# Patient Record
Sex: MALE | Race: ASIAN | NOT HISPANIC OR LATINO | URBAN - METROPOLITAN AREA
[De-identification: names, ages, dates, MRNs, and addresses within clinical notes are randomized per-mention and may not be internally consistent; named-entity substitution may affect disease eponyms.]

---

## 2018-02-08 ENCOUNTER — COMMUNICATION - HEALTHEAST (OUTPATIENT)
Dept: TELEHEALTH | Facility: CLINIC | Age: 43
End: 2018-02-08

## 2018-02-08 ENCOUNTER — OFFICE VISIT - HEALTHEAST (OUTPATIENT)
Dept: FAMILY MEDICINE | Facility: CLINIC | Age: 43
End: 2018-02-08

## 2018-02-08 DIAGNOSIS — I10 ACCELERATED HYPERTENSION: ICD-10-CM

## 2018-02-08 DIAGNOSIS — Z00.00 ROUTINE GENERAL MEDICAL EXAMINATION AT A HEALTH CARE FACILITY: ICD-10-CM

## 2018-02-08 DIAGNOSIS — L40.9 PSORIASIS: ICD-10-CM

## 2018-02-08 DIAGNOSIS — E66.9 OBESITY: ICD-10-CM

## 2018-02-08 LAB
ALBUMIN SERPL-MCNC: 3.8 G/DL (ref 3.5–5)
ALP SERPL-CCNC: 87 U/L (ref 45–120)
ALT SERPL W P-5'-P-CCNC: 33 U/L (ref 0–45)
ANION GAP SERPL CALCULATED.3IONS-SCNC: 14 MMOL/L (ref 5–18)
AST SERPL W P-5'-P-CCNC: 28 U/L (ref 0–40)
ATRIAL RATE - MUSE: 85 BPM
BILIRUB SERPL-MCNC: 0.6 MG/DL (ref 0–1)
BUN SERPL-MCNC: 17 MG/DL (ref 8–22)
CALCIUM SERPL-MCNC: 9.3 MG/DL (ref 8.5–10.5)
CHLORIDE BLD-SCNC: 107 MMOL/L (ref 98–107)
CHOLEST SERPL-MCNC: 220 MG/DL
CO2 SERPL-SCNC: 19 MMOL/L (ref 22–31)
CREAT SERPL-MCNC: 1.33 MG/DL (ref 0.7–1.3)
DIASTOLIC BLOOD PRESSURE - MUSE: NORMAL MMHG
ERYTHROCYTE [DISTWIDTH] IN BLOOD BY AUTOMATED COUNT: 13.3 % (ref 11–14.5)
FASTING STATUS PATIENT QL REPORTED: NO
GFR SERPL CREATININE-BSD FRML MDRD: 60 ML/MIN/1.73M2
GLUCOSE BLD-MCNC: 94 MG/DL (ref 70–125)
HBA1C MFR BLD: 6.1 % (ref 3.5–6)
HCT VFR BLD AUTO: 46.7 % (ref 40–54)
HDLC SERPL-MCNC: 44 MG/DL
HGB BLD-MCNC: 15.5 G/DL (ref 14–18)
INTERPRETATION ECG - MUSE: NORMAL
MAGNESIUM SERPL-MCNC: 2.4 MG/DL (ref 1.8–2.6)
MCH RBC QN AUTO: 28.7 PG (ref 27–34)
MCHC RBC AUTO-ENTMCNC: 33.2 G/DL (ref 32–36)
MCV RBC AUTO: 86 FL (ref 80–100)
P AXIS - MUSE: 35 DEGREES
PLATELET # BLD AUTO: 235 THOU/UL (ref 140–440)
PMV BLD AUTO: 8.6 FL (ref 7–10)
POTASSIUM BLD-SCNC: 4.3 MMOL/L (ref 3.5–5)
PR INTERVAL - MUSE: 168 MS
PROT SERPL-MCNC: 8 G/DL (ref 6–8)
QRS DURATION - MUSE: 100 MS
QT - MUSE: 398 MS
QTC - MUSE: 473 MS
R AXIS - MUSE: 35 DEGREES
RBC # BLD AUTO: 5.4 MILL/UL (ref 4.4–6.2)
SODIUM SERPL-SCNC: 140 MMOL/L (ref 136–145)
SYSTOLIC BLOOD PRESSURE - MUSE: NORMAL MMHG
T AXIS - MUSE: 10 DEGREES
TSH SERPL DL<=0.005 MIU/L-ACNC: 3.26 UIU/ML (ref 0.3–5)
VENTRICULAR RATE- MUSE: 85 BPM
WBC: 10.1 THOU/UL (ref 4–11)

## 2018-02-08 ASSESSMENT — MIFFLIN-ST. JEOR: SCORE: 1753.45

## 2018-02-10 ENCOUNTER — COMMUNICATION - HEALTHEAST (OUTPATIENT)
Dept: FAMILY MEDICINE | Facility: CLINIC | Age: 43
End: 2018-02-10

## 2018-02-15 ENCOUNTER — OFFICE VISIT - HEALTHEAST (OUTPATIENT)
Dept: FAMILY MEDICINE | Facility: CLINIC | Age: 43
End: 2018-02-15

## 2018-02-15 DIAGNOSIS — N28.9 RENAL INSUFFICIENCY: ICD-10-CM

## 2018-02-15 DIAGNOSIS — I10 ESSENTIAL HYPERTENSION: ICD-10-CM

## 2018-02-15 DIAGNOSIS — R73.01 IMPAIRED FASTING GLUCOSE: ICD-10-CM

## 2018-03-15 ENCOUNTER — COMMUNICATION - HEALTHEAST (OUTPATIENT)
Dept: TELEHEALTH | Facility: CLINIC | Age: 43
End: 2018-03-15

## 2018-03-15 ENCOUNTER — OFFICE VISIT - HEALTHEAST (OUTPATIENT)
Dept: FAMILY MEDICINE | Facility: CLINIC | Age: 43
End: 2018-03-15

## 2018-03-15 DIAGNOSIS — R73.01 IMPAIRED FASTING GLUCOSE: ICD-10-CM

## 2018-03-15 DIAGNOSIS — L30.9 DERMATITIS: ICD-10-CM

## 2018-03-15 DIAGNOSIS — E78.00 HYPERCHOLESTEREMIA: ICD-10-CM

## 2018-03-15 DIAGNOSIS — I10 ESSENTIAL HYPERTENSION: ICD-10-CM

## 2018-03-15 DIAGNOSIS — N28.9 RENAL INSUFFICIENCY: ICD-10-CM

## 2018-03-15 LAB
ANION GAP SERPL CALCULATED.3IONS-SCNC: 8 MMOL/L (ref 5–18)
BUN SERPL-MCNC: 18 MG/DL (ref 8–22)
CALCIUM SERPL-MCNC: 9.5 MG/DL (ref 8.5–10.5)
CHLORIDE BLD-SCNC: 103 MMOL/L (ref 98–107)
CHOLEST SERPL-MCNC: 222 MG/DL
CO2 SERPL-SCNC: 27 MMOL/L (ref 22–31)
CREAT SERPL-MCNC: 1.42 MG/DL (ref 0.7–1.3)
FASTING STATUS PATIENT QL REPORTED: YES
GFR SERPL CREATININE-BSD FRML MDRD: 55 ML/MIN/1.73M2
GLUCOSE BLD-MCNC: 93 MG/DL (ref 70–125)
HDLC SERPL-MCNC: 42 MG/DL
LDLC SERPL CALC-MCNC: 150 MG/DL
POTASSIUM BLD-SCNC: 4 MMOL/L (ref 3.5–5)
SODIUM SERPL-SCNC: 138 MMOL/L (ref 136–145)
TRIGL SERPL-MCNC: 150 MG/DL

## 2018-04-04 ENCOUNTER — COMMUNICATION - HEALTHEAST (OUTPATIENT)
Dept: FAMILY MEDICINE | Facility: CLINIC | Age: 43
End: 2018-04-04

## 2018-04-04 DIAGNOSIS — I10 ACCELERATED HYPERTENSION: ICD-10-CM

## 2018-07-24 ENCOUNTER — COMMUNICATION - HEALTHEAST (OUTPATIENT)
Dept: FAMILY MEDICINE | Facility: CLINIC | Age: 43
End: 2018-07-24

## 2018-07-24 DIAGNOSIS — I10 ACCELERATED HYPERTENSION: ICD-10-CM

## 2019-04-09 ENCOUNTER — OFFICE VISIT - HEALTHEAST (OUTPATIENT)
Dept: FAMILY MEDICINE | Facility: CLINIC | Age: 44
End: 2019-04-09

## 2019-04-09 DIAGNOSIS — I10 ACCELERATED HYPERTENSION: ICD-10-CM

## 2019-04-09 DIAGNOSIS — N18.30 CKD (CHRONIC KIDNEY DISEASE) STAGE 3, GFR 30-59 ML/MIN (H): ICD-10-CM

## 2019-04-09 DIAGNOSIS — R06.83 SNORING: ICD-10-CM

## 2019-04-09 DIAGNOSIS — R73.01 IMPAIRED FASTING GLUCOSE: ICD-10-CM

## 2019-04-09 DIAGNOSIS — E66.09 CLASS 2 OBESITY DUE TO EXCESS CALORIES WITHOUT SERIOUS COMORBIDITY WITH BODY MASS INDEX (BMI) OF 36.0 TO 36.9 IN ADULT: ICD-10-CM

## 2019-04-09 DIAGNOSIS — E66.812 CLASS 2 OBESITY DUE TO EXCESS CALORIES WITHOUT SERIOUS COMORBIDITY WITH BODY MASS INDEX (BMI) OF 36.0 TO 36.9 IN ADULT: ICD-10-CM

## 2019-04-30 ENCOUNTER — OFFICE VISIT - HEALTHEAST (OUTPATIENT)
Dept: FAMILY MEDICINE | Facility: CLINIC | Age: 44
End: 2019-04-30

## 2019-04-30 DIAGNOSIS — I10 ACCELERATED HYPERTENSION: ICD-10-CM

## 2019-04-30 DIAGNOSIS — E66.01 CLASS 2 SEVERE OBESITY DUE TO EXCESS CALORIES WITH SERIOUS COMORBIDITY AND BODY MASS INDEX (BMI) OF 35.0 TO 35.9 IN ADULT (H): ICD-10-CM

## 2019-04-30 DIAGNOSIS — N18.30 CKD (CHRONIC KIDNEY DISEASE) STAGE 3, GFR 30-59 ML/MIN (H): ICD-10-CM

## 2019-04-30 DIAGNOSIS — E66.812 CLASS 2 SEVERE OBESITY DUE TO EXCESS CALORIES WITH SERIOUS COMORBIDITY AND BODY MASS INDEX (BMI) OF 35.0 TO 35.9 IN ADULT (H): ICD-10-CM

## 2019-05-02 ENCOUNTER — COMMUNICATION - HEALTHEAST (OUTPATIENT)
Dept: FAMILY MEDICINE | Facility: CLINIC | Age: 44
End: 2019-05-02

## 2019-05-02 DIAGNOSIS — I10 ACCELERATED HYPERTENSION: ICD-10-CM

## 2019-05-03 ENCOUNTER — COMMUNICATION - HEALTHEAST (OUTPATIENT)
Dept: FAMILY MEDICINE | Facility: CLINIC | Age: 44
End: 2019-05-03

## 2019-06-26 ENCOUNTER — OFFICE VISIT - HEALTHEAST (OUTPATIENT)
Dept: SLEEP MEDICINE | Facility: CLINIC | Age: 44
End: 2019-06-26

## 2019-06-26 DIAGNOSIS — R06.83 SNORING: ICD-10-CM

## 2019-06-26 DIAGNOSIS — G47.10 EXCESSIVE SLEEPINESS: ICD-10-CM

## 2019-06-26 DIAGNOSIS — I10 HTN (HYPERTENSION), BENIGN: ICD-10-CM

## 2019-06-26 DIAGNOSIS — R06.81 WITNESSED APNEIC SPELLS: ICD-10-CM

## 2019-06-26 DIAGNOSIS — E66.811 CLASS 1 OBESITY DUE TO EXCESS CALORIES WITH SERIOUS COMORBIDITY AND BODY MASS INDEX (BMI) OF 34.0 TO 34.9 IN ADULT: ICD-10-CM

## 2019-06-26 DIAGNOSIS — E66.09 CLASS 1 OBESITY DUE TO EXCESS CALORIES WITH SERIOUS COMORBIDITY AND BODY MASS INDEX (BMI) OF 34.0 TO 34.9 IN ADULT: ICD-10-CM

## 2019-06-26 ASSESSMENT — MIFFLIN-ST. JEOR: SCORE: 1739.84

## 2019-07-22 ENCOUNTER — RECORDS - HEALTHEAST (OUTPATIENT)
Dept: SLEEP MEDICINE | Facility: CLINIC | Age: 44
End: 2019-07-22

## 2019-07-22 ENCOUNTER — RECORDS - HEALTHEAST (OUTPATIENT)
Dept: ADMINISTRATIVE | Facility: OTHER | Age: 44
End: 2019-07-22

## 2019-07-22 DIAGNOSIS — E66.09 OTHER OBESITY DUE TO EXCESS CALORIES: ICD-10-CM

## 2019-07-22 DIAGNOSIS — R06.83 SNORING: ICD-10-CM

## 2019-07-22 DIAGNOSIS — R06.81 APNEA, NOT ELSEWHERE CLASSIFIED: ICD-10-CM

## 2019-07-22 DIAGNOSIS — I10 ESSENTIAL (PRIMARY) HYPERTENSION: ICD-10-CM

## 2019-07-22 DIAGNOSIS — G47.10 HYPERSOMNIA, UNSPECIFIED: ICD-10-CM

## 2019-07-29 ENCOUNTER — COMMUNICATION - HEALTHEAST (OUTPATIENT)
Dept: SLEEP MEDICINE | Facility: CLINIC | Age: 44
End: 2019-07-29

## 2019-07-29 DIAGNOSIS — G47.33 OSA (OBSTRUCTIVE SLEEP APNEA): ICD-10-CM

## 2019-07-31 ENCOUNTER — OFFICE VISIT - HEALTHEAST (OUTPATIENT)
Dept: FAMILY MEDICINE | Facility: CLINIC | Age: 44
End: 2019-07-31

## 2019-07-31 DIAGNOSIS — I10 ESSENTIAL HYPERTENSION: ICD-10-CM

## 2019-07-31 DIAGNOSIS — L40.9 PSORIASIS: ICD-10-CM

## 2019-07-31 DIAGNOSIS — G47.33 OSA ON CPAP: ICD-10-CM

## 2019-07-31 DIAGNOSIS — N18.30 CKD (CHRONIC KIDNEY DISEASE) STAGE 3, GFR 30-59 ML/MIN (H): ICD-10-CM

## 2019-07-31 DIAGNOSIS — E66.09 CLASS 2 OBESITY DUE TO EXCESS CALORIES WITHOUT SERIOUS COMORBIDITY WITH BODY MASS INDEX (BMI) OF 35.0 TO 35.9 IN ADULT: ICD-10-CM

## 2019-07-31 DIAGNOSIS — E66.812 CLASS 2 OBESITY DUE TO EXCESS CALORIES WITHOUT SERIOUS COMORBIDITY WITH BODY MASS INDEX (BMI) OF 35.0 TO 35.9 IN ADULT: ICD-10-CM

## 2019-07-31 LAB
ANION GAP SERPL CALCULATED.3IONS-SCNC: 6 MMOL/L (ref 5–18)
BUN SERPL-MCNC: 19 MG/DL (ref 8–22)
CALCIUM SERPL-MCNC: 9.9 MG/DL (ref 8.5–10.5)
CHLORIDE BLD-SCNC: 104 MMOL/L (ref 98–107)
CO2 SERPL-SCNC: 30 MMOL/L (ref 22–31)
CREAT SERPL-MCNC: 1.56 MG/DL (ref 0.7–1.3)
GFR SERPL CREATININE-BSD FRML MDRD: 49 ML/MIN/1.73M2
GLUCOSE BLD-MCNC: 85 MG/DL (ref 70–125)
POTASSIUM BLD-SCNC: 4.3 MMOL/L (ref 3.5–5)
SODIUM SERPL-SCNC: 140 MMOL/L (ref 136–145)

## 2019-07-31 ASSESSMENT — MIFFLIN-ST. JEOR: SCORE: 1746.19

## 2019-08-19 ENCOUNTER — AMBULATORY - HEALTHEAST (OUTPATIENT)
Dept: SLEEP MEDICINE | Facility: CLINIC | Age: 44
End: 2019-08-19

## 2019-10-14 ENCOUNTER — OFFICE VISIT - HEALTHEAST (OUTPATIENT)
Dept: SLEEP MEDICINE | Facility: CLINIC | Age: 44
End: 2019-10-14

## 2019-10-14 DIAGNOSIS — G47.33 OSA (OBSTRUCTIVE SLEEP APNEA): ICD-10-CM

## 2019-10-14 ASSESSMENT — MIFFLIN-ST. JEOR: SCORE: 1762.52

## 2019-12-13 ENCOUNTER — AMBULATORY - HEALTHEAST (OUTPATIENT)
Dept: NURSING | Facility: CLINIC | Age: 44
End: 2019-12-13

## 2019-12-13 DIAGNOSIS — Z00.00 HEALTHCARE MAINTENANCE: ICD-10-CM

## 2020-01-31 ENCOUNTER — OFFICE VISIT - HEALTHEAST (OUTPATIENT)
Dept: FAMILY MEDICINE | Facility: CLINIC | Age: 45
End: 2020-01-31

## 2020-01-31 DIAGNOSIS — L40.9 PSORIASIS: ICD-10-CM

## 2020-01-31 DIAGNOSIS — E66.812 CLASS 2 SEVERE OBESITY DUE TO EXCESS CALORIES WITH SERIOUS COMORBIDITY AND BODY MASS INDEX (BMI) OF 35.0 TO 35.9 IN ADULT (H): ICD-10-CM

## 2020-01-31 DIAGNOSIS — M79.671 RIGHT FOOT PAIN: ICD-10-CM

## 2020-01-31 DIAGNOSIS — E66.01 CLASS 2 SEVERE OBESITY DUE TO EXCESS CALORIES WITH SERIOUS COMORBIDITY AND BODY MASS INDEX (BMI) OF 35.0 TO 35.9 IN ADULT (H): ICD-10-CM

## 2020-01-31 DIAGNOSIS — E78.00 HYPERCHOLESTEREMIA: ICD-10-CM

## 2020-01-31 DIAGNOSIS — R73.01 IMPAIRED FASTING GLUCOSE: ICD-10-CM

## 2020-01-31 DIAGNOSIS — N18.30 CKD (CHRONIC KIDNEY DISEASE) STAGE 3, GFR 30-59 ML/MIN (H): ICD-10-CM

## 2020-01-31 DIAGNOSIS — Z00.00 ROUTINE GENERAL MEDICAL EXAMINATION AT A HEALTH CARE FACILITY: ICD-10-CM

## 2020-01-31 DIAGNOSIS — G47.33 OSA ON CPAP: ICD-10-CM

## 2020-01-31 DIAGNOSIS — I10 ESSENTIAL HYPERTENSION: ICD-10-CM

## 2020-01-31 LAB
ALBUMIN SERPL-MCNC: 4.2 G/DL (ref 3.5–5)
ALP SERPL-CCNC: 82 U/L (ref 45–120)
ALT SERPL W P-5'-P-CCNC: 39 U/L (ref 0–45)
ANION GAP SERPL CALCULATED.3IONS-SCNC: 11 MMOL/L (ref 5–18)
AST SERPL W P-5'-P-CCNC: 18 U/L (ref 0–40)
BILIRUB SERPL-MCNC: 0.4 MG/DL (ref 0–1)
BUN SERPL-MCNC: 16 MG/DL (ref 8–22)
CALCIUM SERPL-MCNC: 9.4 MG/DL (ref 8.5–10.5)
CHLORIDE BLD-SCNC: 106 MMOL/L (ref 98–107)
CHOLEST SERPL-MCNC: 221 MG/DL
CO2 SERPL-SCNC: 23 MMOL/L (ref 22–31)
CREAT SERPL-MCNC: 1.34 MG/DL (ref 0.7–1.3)
FASTING STATUS PATIENT QL REPORTED: YES
GFR SERPL CREATININE-BSD FRML MDRD: 58 ML/MIN/1.73M2
GLUCOSE BLD-MCNC: 94 MG/DL (ref 70–125)
HBA1C MFR BLD: 5.7 % (ref 3.5–6)
HDLC SERPL-MCNC: 43 MG/DL
HIV 1+2 AB+HIV1 P24 AG SERPL QL IA: NEGATIVE
LDLC SERPL CALC-MCNC: 139 MG/DL
POTASSIUM BLD-SCNC: 3.9 MMOL/L (ref 3.5–5)
PROT SERPL-MCNC: 7.8 G/DL (ref 6–8)
SODIUM SERPL-SCNC: 140 MMOL/L (ref 136–145)
TRIGL SERPL-MCNC: 197 MG/DL

## 2020-01-31 RX ORDER — METOPROLOL SUCCINATE 50 MG/1
50 TABLET, EXTENDED RELEASE ORAL DAILY
Qty: 90 TABLET | Refills: 1 | Status: SHIPPED | OUTPATIENT
Start: 2020-01-31 | End: 2022-07-06

## 2020-01-31 ASSESSMENT — MIFFLIN-ST. JEOR: SCORE: 1757.42

## 2020-02-10 ENCOUNTER — OFFICE VISIT - HEALTHEAST (OUTPATIENT)
Dept: PODIATRY | Facility: CLINIC | Age: 45
End: 2020-02-10

## 2020-02-10 DIAGNOSIS — G57.61 MORTON'S NEUROMA OF RIGHT FOOT: ICD-10-CM

## 2020-02-10 ASSESSMENT — MIFFLIN-ST. JEOR: SCORE: 1757.42

## 2020-02-28 ENCOUNTER — COMMUNICATION - HEALTHEAST (OUTPATIENT)
Dept: FAMILY MEDICINE | Facility: CLINIC | Age: 45
End: 2020-02-28

## 2020-02-28 DIAGNOSIS — I10 ESSENTIAL HYPERTENSION: ICD-10-CM

## 2020-02-28 RX ORDER — AMLODIPINE AND BENAZEPRIL HYDROCHLORIDE 10; 40 MG/1; MG/1
1 CAPSULE ORAL DAILY
Qty: 90 CAPSULE | Refills: 3 | Status: SHIPPED | OUTPATIENT
Start: 2020-02-28 | End: 2022-07-06

## 2020-11-08 ENCOUNTER — AMBULATORY - HEALTHEAST (OUTPATIENT)
Dept: NURSING | Facility: CLINIC | Age: 45
End: 2020-11-08

## 2021-01-09 ENCOUNTER — COMMUNICATION - HEALTHEAST (OUTPATIENT)
Dept: FAMILY MEDICINE | Facility: CLINIC | Age: 46
End: 2021-01-09

## 2021-01-09 DIAGNOSIS — L40.9 PSORIASIS: ICD-10-CM

## 2021-01-11 RX ORDER — BETAMETHASONE DIPROPIONATE 0.5 MG/G
CREAM TOPICAL
Qty: 45 G | Refills: 1 | Status: SHIPPED | OUTPATIENT
Start: 2021-01-11 | End: 2022-06-29

## 2021-04-20 ENCOUNTER — AMBULATORY - HEALTHEAST (OUTPATIENT)
Dept: NURSING | Facility: CLINIC | Age: 46
End: 2021-04-20

## 2021-05-11 ENCOUNTER — AMBULATORY - HEALTHEAST (OUTPATIENT)
Dept: NURSING | Facility: CLINIC | Age: 46
End: 2021-05-11

## 2021-05-27 NOTE — PROGRESS NOTES
Assessment/Plan:    1. Accelerated hypertension  Accelerated hypertension.  Previously seen February 2018 and started on losartan hydrochlorothiazide 100/25 daily.  Follow-up in March 15, 2018 with blood pressure 124/88 on recheck.  However due to some described potential syncopal type episodes stopped medication several months ago.  Will initiate amlodipine benazepril 5/20 and use 1 capsule daily with consideration for dose increase to 2 capsules (10/40) after 2 weeks if blood pressures remain greater than 160/100.  Referred patient as well to sleep medicine for rule out HARRY with snoring history.  - amLODIPine-benazepril (LOTREL 5-20) 5-20 mg per capsule; Take 1 capsule by mouth daily.  Dispense: 30 capsule; Refill: 1  - Ambulatory referral to Sleep Medicine    2. CKD (chronic kidney disease) stage 3, GFR 30-59 ml/min (H)  CKD stage III with prior creatinine 1.42 and GFR 55.  Nonfasting and will check repeat labs at follow-up in 3 weeks.    3. Impaired fasting glucose  Impaired fasting glucose history.  Check A1c at follow-up in 3 weeks.    4. Class 2 obesity due to excess calories without serious comorbidity with body mass index (BMI) of 36.0 to 36.9 in adult  Dietary and exercise modification for weight goal less than 200 pounds initially, less than 180 pounds ideally.    5. Snoring  Sleep study to be completed with snoring history and accelerated hypertension rule out HARRY.  - Ambulatory referral to Sleep Medicine      The following high BMI interventions were performed this visit: encouragement to exercise, weight monitoring, weight loss from baseline weight and lifestyle education regarding diet         Subjective:    Rufino Turner is seen today for hypertension follow-up.  Had been seen in February 2018 with accelerated hypertension concerns.  Started on losartan hydrochlorothiazide 100/25 daily which seem to be helping however had a syncopal episode perhaps on an airplane however was not witnessed and also has  "had issues if he has 1 or 2 drinks typically beer where he might \"faint\".  Does snore.  No history of sleep study.  Obesity noted not getting consistent exercise.  CKD stage III with creatinine 1.42 and GFR 55 with prior A1c of 6.1%.  Mild cholesterol elevation.  Nonfasting today.  Denies chest pain.  No headache or vision change.  No other dizziness.  Comprehensive review of systems as above otherwise all negative.     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 No pertinent surgical history.     No family history on file.     History reviewed. No pertinent past medical history.     Social History     Tobacco Use     Smoking status: Former Smoker     Last attempt to quit: 2/15/2018     Years since quittin.1     Smokeless tobacco: Never Used   Substance Use Topics     Alcohol use: Not on file     Drug use: Not on file        Current Outpatient Medications   Medication Sig Dispense Refill     betamethasone dipropionate (DIPROLENE) 0.05 % cream apply topically sparingly to affected areas twice daily as needed 45 g 2     amLODIPine-benazepril (LOTREL 5-20) 5-20 mg per capsule Take 1 capsule by mouth daily. 30 capsule 1     losartan-hydrochlorothiazide (HYZAAR) 100-25 mg per tablet TAKE 1 TABLET BY MOUTH DAILY. 90 tablet 2     No current facility-administered medications for this visit.           Objective:    Vitals:    19 1035 19 1103   BP: (!) 172/120 (!) 168/128   Pulse: 100    SpO2: 97%    Weight: 212 lb (96.2 kg)       Body mass index is 36.11 kg/m .    Alert.  No apparent distress.  Blood pressure 160/128 on recheck.  Chest clear.  Cardiac exam regular.  Extremities warm " and dry.  BMI 36.11.      This note has been dictated using voice recognition software and as a result may contain minor grammatical errors and unintended word substitutions.

## 2021-05-28 NOTE — TELEPHONE ENCOUNTER
Amlodipine-benazepril 10-40 mg capsules pended for approval to take 1 capsule by mouth daily if appropriate

## 2021-05-28 NOTE — PROGRESS NOTES
Assessment/Plan:    1. Accelerated hypertension  Accelerated hypertension with improved control.  Blood pressure 114/86 on recheck.  Will increase amlodipine benazepril 5/20 from 1 capsule to 2 capsules daily.  Discontinue losartan hydrochlorothiazide due to potential interaction with benazepril and utilize hydrochlorothiazide 25 mg 1 tablet daily.  Blood pressure recheck at follow-up in 3 months.  - amLODIPine-benazepril (LOTREL 5-20) 5-20 mg per capsule; Take 2 capsules by mouth daily.  Dispense: 180 capsule; Refill: 1  - hydroCHLOROthiazide (HYDRODIURIL) 25 MG tablet; Take 1 tablet (25 mg total) by mouth daily.  Dispense: 90 tablet; Refill: 1    2. CKD (chronic kidney disease) stage 3, GFR 30-59 ml/min (H)  History of CKD stage III with prior creatinine 1.42 and GFR 55.  Repeat renal function at follow-up.    3. Class 2 severe obesity due to excess calories with serious comorbidity and body mass index (BMI) of 35.0 to 35.9 in adult (H)  Dietary and exercise modification for weight goal less than 200 pounds initially, less than 180 pounds ideally.  Does have scheduled office visit with sleep medicine June 26, 2019 with underlying obesity with history of accelerated hypertension.        Subjective:    Rufino GAIL Worthingtonjesenia is seen today for follow-up evaluation.  Accelerated hypertension.  Reviewed office note from April 9, 2019.  Had been on losartan hydrochlorothiazide 100/25.  Due to elevated blood pressure readings addition of amlodipine benazepril was provided.  No side effects.  History of impaired fasting glucose.  History of CKD stage III with prior creatinine 1.42 and GFR 55.  Does snore.  Due to elevated blood pressures etc. did set up for sleep study referral which is scheduled for June 26, 2019.  No chest pain.  Some increased urinary frequency.  Comprehensive review of systems as above otherwise all negative.     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 No pertinent surgical history.     No family history on file.     History reviewed. No pertinent past medical history.     Social History     Tobacco Use     Smoking status: Former Smoker     Last attempt to quit: 2/15/2018     Years since quittin.2     Smokeless tobacco: Never Used   Substance Use Topics     Alcohol use: Not on file     Drug use: Not on file        Current Outpatient Medications   Medication Sig Dispense Refill     amLODIPine-benazepril (LOTREL 5-20) 5-20 mg per capsule Take 2 capsules by mouth daily. 180 capsule 1     betamethasone dipropionate (DIPROLENE) 0.05 % cream apply topically sparingly to affected areas twice daily as needed 45 g 2     hydroCHLOROthiazide (HYDRODIURIL) 25 MG tablet Take 1 tablet (25 mg total) by mouth daily. 90 tablet 1     No current facility-administered medications for this visit.           Objective:    Vitals:    19 1027   BP: 120/90   Pulse: 91   SpO2: 95%   Weight: 209 lb (94.8 kg)      Body mass index is 35.6 kg/m .    Alert.  No apparent distress with blood pressure 114/86 on recheck.  Chest clear.  Cardiac exam regular.  Extremities warm and dry without significant peripheral edema.  No rash.      This note has been dictated using voice recognition software and as a result may contain minor grammatical errors and unintended word substitutions.

## 2021-05-28 NOTE — TELEPHONE ENCOUNTER
Medication Question or Clarification  Who is calling: Pharmacy: [facility] fax  What medication are you calling about? (include dose and sig)   Disp Refills Start End     amLODIPine-benazepril (LOTREL 5-20) 5-20 mg per capsule 180 capsule 1 4/30/2019     Sig - Route: Take 2 capsules by mouth daily. - Oral    Sent to pharmacy as: amLODIPine-benazepril (LOTREL 5-20) 5-20 mg per capsule    E-Prescribing Status: Receipt confirmed by pharmacy (4/30/2019 11:20 AM CDT)      Who prescribed the medication?: Solomon Wilson MD   What is your question/concern?: Fax stated insurance will only cover 1 cap per day. Pharmacy suggested for Solomon Wilson MD to send in the higher strength or do a PA.    ID: [id number]  Group: [id number]  PCN: [id number]  BIN: [id number]    Pharmacy: [facility]  Okay to leave a detailed message?: No  Site CMT - Please call the pharmacy to obtain any additional needed information.

## 2021-05-30 NOTE — PROGRESS NOTES
Dear  Solomon Wilson Md  [address]  [place] [address]    Thank you for the opportunity to participate in the care of  Rufino Turner.    Rufino Turner is sent by Solomon Wilson MD for a sleep consultation regarding snoring and elevated blood pressure.   He has a history of HTN and obesity.    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  Works 8:30 PM - 7 AM Tu - F.  Doesn't get into bed until around 10 AM and will sleep until around 1 PM.  Then naps another 2 hours from 5 - 7 PM.     On non-work nights will have early morning nap the first day (10 AM - 1 PM).  Then will nap around 6 - 7 PM.  Then will go to bed around 11 PM but wakes up every 1.5 - 2 hours to get up and pace around or snack or drink.     On Becker and M nights will try to sleep more normally but still up and down all night.  Usually in bed from 11 PM - 6 AM.      Sleep Disordered Breathing - Snoring is fairly loud and may wake wife from sleep. Has gasping and witnessed apneas in sleep.   Has nocturia few times per night.     Upon waking feels ok.  He denies morning headaches.  No morning dry mouth.  Does have morning sinus congestion.   Patient was counseled on the importance of driving while alert, to pull over if drowsy, or nap before getting into the vehicle if sleepy.    Movement/Behaviors - Nonsense somniloquy.  No somnambulism.  No sleep related eating.  No dream enactment behavior.  Does have jerks in sleep.   He is noted to have occasional bruxism.      Patient denies typical restless legs syndrome symptoms.    Alcohol use - 1 - 2 beers per month typically  Caffeine intake - 1 energy drink or coffee or soda at work. Last caffeine intake is usually overnight on night shift.  Tobacco exposure - 2 - 3 cigarettes.  Illicit substances - none    Lives with wife (Valentina) and 7 kids (18, 15, 11, 10, 9 , 7 , 4).  Has 1 pet, dog.     No immediate family history of snoring or  Obstructive Sleep Apnea     Past Medical History:  History reviewed. No pertinent past medical history.    Problem List:  There are no active problems to display for this patient.       Past Surgical History:  History reviewed. No pertinent surgical history.     Meds:  Current Outpatient Medications   Medication Sig Dispense Refill     amLODIPine-benazepril (LOTREL) 10-40 mg per capsule Take 1 capsule by mouth daily. 90 capsule 3     betamethasone dipropionate (DIPROLENE) 0.05 % cream apply topically sparingly to affected areas twice daily as needed 45 g 2     No current facility-administered medications for this visit.         Allergies:  Contrast [iodixanol] and Shrimp     Social History:  Social History     Socioeconomic History     Marital status:      Spouse name: Not on file     Number of children: Not on file     Years of education: Not on file     Highest education level: Not on file   Occupational History     Not on file   Social Needs     Financial resource strain: Not on file     Food insecurity:     Worry: Not on file     Inability: Not on file     Transportation needs:     Medical: Not on file     Non-medical: Not on file   Tobacco Use     Smoking status: Former Smoker     Last attempt to quit: 2/15/2018     Years since quittin.3     Smokeless tobacco: Never Used   Substance and Sexual Activity     Alcohol use: Not on file     Drug use: Not on file     Sexual activity: Not on file   Lifestyle     Physical activity:     Days per week: Not on file     Minutes per session: Not on file     Stress: Not on file   Relationships     Social connections:     Talks on phone: Not on file     Gets together: Not on file     Attends Sabianist service: Not on file     Active member of club or organization: Not on file     Attends meetings of clubs or organizations: Not on file     Relationship status: Not on file     Intimate partner violence:     Fear of current or ex partner: Not on file     Emotionally  "abused: Not on file     Physically abused: Not on file     Forced sexual activity: Not on file   Other Topics Concern     Not on file   Social History Narrative     Not on file        Family History:  History reviewed. No pertinent family history.     Review of Systems: Chest pain  A complete review of systems reviewed by me is negative with the exeption of what has been mentioned in the history of present illness.    Physical Exam:  BP (!) 142/106 (Patient Site: Right Arm, Patient Position: Sitting, Cuff Size: Adult Large)   Pulse 66   Ht 5' 4.25\" (1.632 m)   Wt 205 lb (93 kg)   SpO2 98%   BMI 34.91 kg/m    General appearance: No apparent distress, well groomed.    HEENT:   Head: Normocephalic, atraumatic.  Eyes: PERRL  Nose: Nares patent.  No exudate.  Septal deviation noted.  Mouth: Teeth: normal   Tongue: Normal  Oropharynx:  Mallampati Classification: IV    Tonsils: Grade 0    Uvula: Normal    Neck: Supple without bruit.      Cardiac: Regular rate and rhythm.  No murmurs.  Radial pulses are strong and symmetric.  Pulmonary: Symmetric air movement, lungs clear to auscultation bilaterally.  Musculoskeletal: No edema noted.  No clubbing or cyanosis.  Skin: Warm, dry, intact.  Neurologic: Alert and oriented to person, place and time   Gait is normal.  Psychiatric: Affect pleasant.  Mood good.     Labs/Studies:  Lab Results   Component Value Date    WBC 10.1 02/08/2018    HGB 15.5 02/08/2018    HCT 46.7 02/08/2018    MCV 86 02/08/2018     02/08/2018         Chemistry        Component Value Date/Time     03/15/2018 1643    K 4.0 03/15/2018 1643     03/15/2018 1643    CO2 27 03/15/2018 1643    BUN 18 03/15/2018 1643    CREATININE 1.42 (H) 03/15/2018 1643    GLU 93 03/15/2018 1643        Component Value Date/Time    CALCIUM 9.5 03/15/2018 1643    ALKPHOS 87 02/08/2018 1552    AST 28 02/08/2018 1552    ALT 33 02/08/2018 1552    BILITOT 0.6 02/08/2018 1552        No results found for: " FERRITIN  Lab Results   Component Value Date    TSH 3.26 02/08/2018     Lab Results   Component Value Date    HGBA1C 6.1 (H) 02/08/2018     Assessment and Plan:  1. Snoring  2. Witnessed apneic spells  I have a high suspicion for HARRY based on presence of snoring, witnessed apneas, obesity, elevated neck circumference, gender and ESS. We discussed pathophysiology of obstructive sleep apnea, testing with overnight polysomnography, and treatment modalities (CPAP only discussed at this visit). We discussed consequences of untreated Obstructive Sleep Apnea, such as markedly elevated risk for heart attack or stroke. Patient is interested in treatment and willing to undergo overnight sleep testing.  Home sleep testing is appropriate for this patient.  Study has been ordered today.    - Home Sleep Test with Type III Device; Future    3. Excessive sleepiness  Combination of shift work, insufficient sleep, and possible HARRY. Counseled on strategies for improving sleep but due to family constraints he is somewhat stuck.  - Home Sleep Test with Type III Device; Future    4. HTN (hypertension), benign  Patient was counseled on the effects of untreated/sub-optimally treated hypertension.  He was told to discuss findings with his PCP. Patient to follow up with Primary Care provider regarding elevated blood pressure.   - Home Sleep Test with Type III Device; Future    5. Class 1 obesity due to excess calories with serious comorbidity and body mass index (BMI) of 34.0 to 34.9 in adult  We discussed the link between obesity, sleep apnea, and health outcomes.  Patient was encouraged to decrease caloric intake and increase activity levels to try to move towards a normal weight.  He was encouraged to discuss further strategies with his primary care provider.   - Home Sleep Test with Type III Device; Future     Patient verbalized understanding of these issues, agrees with the plan and all questions were answered today. Patient was given  an opportuntity to voice any other symptoms or concerns not listed above. Patient did not have any other symptoms or concerns.      Efrain Mortensen MD  [facility] Board Certified in Internal Medicine and Sleep Medicine  [facility].

## 2021-05-30 NOTE — PATIENT INSTRUCTIONS - HE
Follow-up with PCP regarding elevated blood pressure:   BP Readings from Last 3 Encounters:   06/26/19 (!) 142/106   04/30/19 120/90   04/09/19 (!) 168/128

## 2021-05-31 NOTE — TELEPHONE ENCOUNTER
Order for Durable Medical Equipment was processed and equipment ordered.     Date: 8/5/2019    Equipment Ordered: New CPAP device     DME Company: [employer]    Fax Number: Pacee Her ([email or web address])    Ordering Provider: Efrain Mortensen MD Danielle S Gant, [facility] 8/5/2019 8:49 AM

## 2021-05-31 NOTE — PROGRESS NOTES
Patient was offered choice of vendor and chose [facility] beds at the [facility].  Patient Rufino Turner was set up at [facility] on 8/19/19. Patient received a Resmed [id number] Auto. Pressures were set at 6-15.   Patient s ramp is 6 cm H2O for off and FLEX/EPR is EPR 2.  Patient received a Resmed Mask name: Airfit P30i  pillow Size med, heated tubing and heated humidifier.    Gerald Her

## 2021-05-31 NOTE — TELEPHONE ENCOUNTER
New CPAP order placed.  Please send order to [facility].  Patient will need f/u with me in 2 months (readjust f/u). Please contact to schedule.  Efrain Mortensen MD  2:37 PM, 8/3/2019

## 2021-05-31 NOTE — PROGRESS NOTES
Assessment/Plan:    1. Essential hypertension  Hypertension with improved control.  Blood pressure 102/84 with history of accelerated hypertension.  Continues amlodipine benazepril 10/40 using 1 tab daily.  Will discontinue hydrochlorothiazide 25 mg daily with some described associated shortness of breath with this better when he discontinued medicine for perhaps 1 month.  Reassess at physical no later than 6 months.  - Basic Metabolic Panel  - amLODIPine-benazepril (LOTREL) 10-40 mg per capsule; Take 1 capsule by mouth daily.  Dispense: 90 capsule; Refill: 3    2. Class 2 obesity due to excess calories without serious comorbidity with body mass index (BMI) of 35.0 to 35.9 in adult  Dietary and exercise modifications reviewed.  Weight goal remains less than 200 pounds initially, less than 180 pounds ideally.    3. HARRY on CPAP  Recent diagnosis with HARRY.  Will have CPAP titrated per sleep clinic.    4. Psoriasis  Bilateral shin dermatitis.  Has seen dermatologist.  Refill on betamethasone provided.  - betamethasone dipropionate (DIPROLENE) 0.05 % cream; apply topically sparingly to affected areas twice daily as needed  Dispense: 45 g; Refill: 3    5. CKD (chronic kidney disease) stage 3, GFR 30-59 ml/min (H)  History CKD stage IIIa.  Prior creatinine 1.42 and GFR 55.  Repeat basic metabolic panel.      The following high BMI interventions were performed this visit: encouragement to exercise, weight monitoring, weight loss from baseline weight and lifestyle education regarding diet .  Ensure ongoing efforts to achieve weight goal < 200 pounds initially, < 180 pounds ideally.         Subjective:    Rufino Turner is seen today for follow-up evaluation.  History of accelerated hypertension.  Doing better with current use of amlodipine benazepril 10/40 using 1 tablet daily.  Hydrochlorothiazide 25 mg was also being utilized however some shortness of breath associated with this.  Discontinued.  Felt better.  Resumed after 1  "month perhaps 1 week ago.  Has noticed some symptoms returning.  No ankle swelling.  No orthopnea or PND.  No palpitations.  History of CKD stage IIIa.  He did complete a sleep study recently and was notified he does have moderate obstructive sleep apnea and will need CPAP as tolerated.  Has seen dermatologist for bilateral shin rash consistent with psoriatic plaques.  Betamethasone with some benefit.  May need biopsy if persistent concern without response to treatment.  Comprehensive review of systems as above otherwise all negative.  No chest pain.  Denies fever.     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     No family history on file.     No past medical history on file.     Social History     Tobacco Use     Smoking status: Former Smoker     Last attempt to quit: 2/15/2018     Years since quittin.4     Smokeless tobacco: Never Used   Substance Use Topics     Alcohol use: Not on file     Drug use: Not on file        Current Outpatient Medications   Medication Sig Dispense Refill     amLODIPine-benazepril (LOTREL) 10-40 mg per capsule Take 1 capsule by mouth daily. 90 capsule 3     betamethasone dipropionate (DIPROLENE) 0.05 % cream apply topically sparingly to affected areas twice daily as needed 45 g 3     No current facility-administered medications for this visit.           Objective:    Vitals:    19 1540   BP: 108/70   Pulse: 80   SpO2: 98%   Weight: 206 lb 6.4 oz (93.6 kg)   Height: 5' 4.25\" (1.632 m)      Body mass index is 35.15 kg/m .    Alert.  No apparent distress.  Chest clear to auscultation.  Cardiac exam regular rate and rhythm.  Blood pressure " on recheck 102/84.  Extremities warm and dry.  Psoriatic plaques apparent on anterior shins right greater than left.  No other skin rash identified.      This note has been dictated using voice recognition software and as a result may contain minor grammatical errors and unintended word substitutions.

## 2021-06-01 VITALS — BODY MASS INDEX: 35.6 KG/M2 | WEIGHT: 209 LBS

## 2021-06-01 VITALS — BODY MASS INDEX: 35.51 KG/M2 | HEIGHT: 64 IN | WEIGHT: 208 LBS

## 2021-06-01 VITALS — WEIGHT: 206 LBS | BODY MASS INDEX: 35.09 KG/M2

## 2021-06-02 ENCOUNTER — RECORDS - HEALTHEAST (OUTPATIENT)
Dept: ADMINISTRATIVE | Facility: CLINIC | Age: 46
End: 2021-06-02

## 2021-06-02 VITALS — WEIGHT: 212 LBS | BODY MASS INDEX: 36.11 KG/M2

## 2021-06-02 NOTE — PROGRESS NOTES
"He is a 44 y.o. y/o male patient who comes to the sleep medicine clinic for PAP therapy follow up.    [facility]Med, DME: [facility]    He was diagnosed with moderate HARRY (AHI 21.4/hr) and has been using nPAP therapy.  Initial PAP settings: 6 - 15 cmH2O with nasal mask    Current PAP settings: same    His symptoms are improved since he started using CPAP. He feels more refreshed when he wakes up. He doesn't snore when he wears CPAP. 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  He denies any PAP intolerance. He is using the device nightly and tolerates the pressure well.     30-Days Efficacy and Compliance Data  Residual AHI: 0.8/hr  Leak: 5.1 LPM (95%)  Days used > 4 hours: 17/30 (28/30 with use and many days just shy of 4 hours)  Average usage per night: 4:11 hours  95th percentile P: 11.7 cmH2O  Mask Tolerance: Fine  Skin irritation: None    Physical Exam:  BP (!) 135/96   Pulse 89   Ht 5' 4.25\" (1.632 m)   Wt 210 lb (95.3 kg)   SpO2 96%   BMI 35.77 kg/m    General appearance: No apparent distress, well groomed.  Head: Normocephalic, atraumatic.  Musculoskeletal: No edema noted.  No clubbing or cyanosis.  Skin: Warm, dry, intact.  Neurologic: Alert and oriented to person, place and time   Gait is normal.  Psychiatric: Affect normal.  Mood normal.     Labs/Studies:  I reviewed the efficacy and compliance report from his device. Data summarized on the HPI.     Assessment and Plan:  1. Moderate Obstructive Sleep Apnea.  Residual AHI is good  Compliance is improving recently. We revisited importance of duration of use again today.  Patient is benefiting from using PAP therapy.    Continue with P = same.    We counseled the patient on the importance of using his PAP device every night and the risks of not treating sleep apnea.      Patient verbalized understanding of these issues, agrees with the plan and all questions were answered " today. Patient was given an opportuntity to voice any other symptoms or concerns not listed above. Patient did not have any other symptoms or concerns.      Patient told to return in 12 months.     Efrain GONZALEZ Board Certified in Internal Medicine and Sleep Medicine  [facility].    We spent a total of 15 minutes of face-to-face encounter and more than 50% of the encounter was used for counseling or coordination of care.

## 2021-06-03 VITALS — BODY MASS INDEX: 35 KG/M2 | WEIGHT: 205 LBS | HEIGHT: 64 IN

## 2021-06-03 VITALS — WEIGHT: 206.4 LBS | HEIGHT: 64 IN | BODY MASS INDEX: 35.24 KG/M2

## 2021-06-03 VITALS
OXYGEN SATURATION: 96 % | DIASTOLIC BLOOD PRESSURE: 96 MMHG | HEART RATE: 89 BPM | WEIGHT: 210 LBS | HEIGHT: 64 IN | SYSTOLIC BLOOD PRESSURE: 135 MMHG | BODY MASS INDEX: 35.85 KG/M2

## 2021-06-03 VITALS — BODY MASS INDEX: 35.6 KG/M2 | WEIGHT: 209 LBS

## 2021-06-04 VITALS
SYSTOLIC BLOOD PRESSURE: 126 MMHG | BODY MASS INDEX: 34.66 KG/M2 | HEIGHT: 65 IN | WEIGHT: 208 LBS | HEART RATE: 86 BPM | DIASTOLIC BLOOD PRESSURE: 98 MMHG | OXYGEN SATURATION: 96 %

## 2021-06-04 VITALS
SYSTOLIC BLOOD PRESSURE: 130 MMHG | BODY MASS INDEX: 34.66 KG/M2 | TEMPERATURE: 97.5 F | WEIGHT: 208 LBS | RESPIRATION RATE: 16 BRPM | HEART RATE: 74 BPM | HEIGHT: 65 IN | DIASTOLIC BLOOD PRESSURE: 84 MMHG

## 2021-06-05 NOTE — PROGRESS NOTES
Assessment/Plan:     1. Routine general medical examination at a health care facility  Routine healthcare maintenance.  Preventative cares reviewed.  Patient is fasting today.  Annual physical exams to continue.  HIV screen based on age criteria.  - HIV Antigen/Antibody Screening [place]    2. Class 2 severe obesity due to excess calories with serious comorbidity and body mass index (BMI) of 35.0 to 35.9 in adult (H)  Dietary and exercise modifications remain for weight goal less than 190 pounds initially, less than 180 pounds ideally.    3. Essential hypertension  Hypertension suboptimal control with blood pressure 126/98 on recheck.  Continues use of amlodipine benazepril 10/40 using 1 tab daily.  Addition of metoprolol succinate 50 mg daily with blood pressure recheck in office no later than 3 months.  Med monitoring completed today.  - Comprehensive Metabolic Panel  - metoprolol succinate (TOPROL-XL) 50 MG 24 hr tablet; Take 1 tablet (50 mg total) by mouth daily.  Dispense: 90 tablet; Refill: 1    4. HARRY on CPAP  Continue CPAP for HARRY management.    5. CKD (chronic kidney disease) stage 3, GFR 30-59 ml/min (H)  History of CKD stage III.  Ensure stable renal function.  - Comprehensive Metabolic Panel    6. Impaired fasting glucose  Prior A1c of 6.1% February 8, 2018.  Fasting glucose and A1c pending.  - Comprehensive Metabolic Panel  - Glycosylated Hemoglobin A1c    7. Hypercholesteremia  Hypercholesterolemia, diet controlled currently with therapeutic lifestyle changes reviewed as noted above.  - Lipid Cascade    8. Psoriasis  Psoriasis.  Betamethasone topical as directed as needed.    9. Right foot pain  Right foot pain question Sierra's neuroma near the third MTP joint plantar aspect.  No callus.  No evidence for plantars wart etc.  No ankle edema.  Dorsalis pedis and posterior tibial pulses intact.  - Ambulatory referral to Podiatry       The following high BMI interventions were performed this visit:  encouragement to exercise, weight monitoring, weight loss from baseline weight and lifestyle education regarding diet.  Ensure ongoing efforts to achieve weight goal < 190 pounds initially, < 180 pounds ideally.              Subjective:      Rufino Turner is a 44 y.o. male who presents for an annual exam.  In general doing well however has had right foot pain.  Has gained 2 pounds since prior visit 2019.  Woke up 1 day and it just hurts when he puts pressure on balls of feet near the third MTP joint region.  No history of plantar fasciitis.  No evidence for pes planus deformity etc.  Does have history of accelerated hypertension currently using amlodipine benazepril 10/40 using 1 tab daily.  Was on thiazide diuretic however this had some side effects therefore discontinued previously.  CKD stage III with prior creatinine 1.42 and GFR 55.  Betamethasone as needed for psoriasis treatment.  CPAP for HARRY which she is tolerating well.  Denies recent illness.  Comprehensive review of systems as above otherwise all negative.  No chest pain or shortness of breath.     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  Smoke - 3-4 cig a day  Alcoholic beverages - very rare  No regular exercise  Surgeries - Appy - 34 years old  Family hx  Mother - living -   Father -  - age 50's hepatitis B, HTN  Siblings - 3 living -    Healthy Habits:   Regular Exercise: No  Healthy Diet: No  Dental Visits Regularly: Yes  Seat Belt: Yes   Sexually active: Yes  Colonoscopy: N/A  Lipid Profile: Yes  Glucose Screen: Yes    Immunization History   Administered Date(s) Administered     Influenza, inj, historic,unspecified 2017     Influenza,seasonal,quad inj =/> 6months 2019     Pneumo Polysac 23-V 2018     Tdap 2018     Immunization status: up to date and documented.  Vision Screening:both eyes  Hearing: PASS      Current Outpatient Medications   Medication Sig Dispense Refill     amLODIPine-benazepril (LOTREL) 10-40 mg per capsule Take 1 capsule by mouth daily. 90 capsule 3     betamethasone dipropionate (DIPROLENE) 0.05 % cream apply topically sparingly to affected areas twice daily as needed 45 g 3     metoprolol succinate (TOPROL-XL) 50 MG 24 hr tablet Take 1 tablet (50 mg total) by mouth daily. 90 tablet 1     No current facility-administered medications for this visit.      History reviewed. No pertinent past medical history.  History reviewed. No pertinent surgical history.  Contrast [iodixanol] and Shrimp  History reviewed. No pertinent family history.  Social History     Socioeconomic History     Marital status:      Spouse name: Not on file     Number of children: Not on file     Years of education: Not on file     Highest education level: Not on file   Occupational History     Not on file   Social Needs     Financial resource strain: Not on file     Food insecurity:     Worry: Not on file     Inability: Not on file     Transportation needs:     Medical: Not on file     Non-medical: Not on file   Tobacco Use     Smoking status: Former Smoker     Last attempt to quit: 2/15/2018     Years since quittin.9     Smokeless tobacco: Never Used   Substance and Sexual Activity     Alcohol use: Not on file     Drug use: Not on file     Sexual activity: Not on file   Lifestyle     Physical activity:     Days per week: Not on file     Minutes per session: Not on file     Stress: Not on file   Relationships     Social connections:     Talks on phone: Not on file     Gets together: Not on file     Attends Buddhist service: Not on file     Active member of club or organization: Not on file     Attends meetings of clubs or organizations: Not on file     Relationship status: Not on file     Intimate partner violence:     Fear of current or ex partner: Not on file     Emotionally abused: Not on file     Physically  "abused: Not on file     Forced sexual activity: Not on file   Other Topics Concern     Not on file   Social History Narrative     Not on file       Review of Systems  Comprehensive ROS: as above, otherwise all negative.           Objective:     BP (!) 126/98   Pulse 86   Ht 5' 4.5\" (1.638 m)   Wt 208 lb (94.3 kg)   SpO2 96%   BMI 35.15 kg/m    Body mass index is 35.15 kg/m .    Physical    General Appearance:    Alert, cooperative, no distress, appears stated age.  Obesity.   Head:    Normocephalic, without obvious abnormality, atraumatic   Eyes:    PERRL, conjunctiva/corneas clear, EOM's intact, fundi     benign, both eyes        Ears:    Normal TM's and external ear canals, both ears   Nose:   Nares normal, septum midline, mucosa normal, no drainage    or sinus tenderness   Throat:   Lips, mucosa, and tongue normal; teeth and gums normal   Neck:   Supple, symmetrical, trachea midline, no adenopathy;        thyroid:  No enlargement/tenderness/nodules; no carotid    bruit or JVD   Back:     Symmetric, no curvature, ROM normal, no CVA tenderness   Lungs:     Clear to auscultation bilaterally, respirations unlabored   Chest wall:    No tenderness or deformity   Heart:    Regular rate and rhythm, S1 and S2 normal, no murmur, rub   or gallop   Abdomen:     Soft, non-tender, bowel sounds active all four quadrants,     no masses, no organomegaly.     Genitalia:    Normal male without lesion, discharge or tenderness.  No inguinal hernia noted.     Rectal:    deferred   Extremities:   Extremities normal, atraumatic, no cyanosis or edema.  Right foot with tenderness plantar aspect third MTP joint.  No calcaneal plantar fascial insertion tenderness.  No ankle edema.  Good dorsalis pedis and posterior tibial pulses bilateral feet.   Pulses:   2+ and symmetric all extremities   Skin:   Skin color, texture, turgor normal, no rashes or lesions   Lymph nodes:   Cervical, supraclavicular, and axillary nodes normal "   Neurologic:   CNII-XII intact. Normal strength, sensation and reflexes       throughout                This note has been dictated using voice recognition software and as a result may contain minor grammatical errors and unintended word substitutions.

## 2021-06-06 NOTE — TELEPHONE ENCOUNTER
Refill Approved    Rx renewed per Medication Renewal Policy. Medication was last renewed on 7/31/19.    Christiana Mukherjee, [facility] [place] Connection Triage/Med Refill 2/28/2020     Requested Prescriptions   Pending Prescriptions Disp Refills     amLODIPine-benazepril (LOTREL) 10-40 mg per capsule [Pharmacy Med Name: AMLODIPINE-BENAZEPRIL 10-40 MG] 90 capsule 2     Sig: TAKE 1 CAPSULE BY MOUTH EVERY DAY       [facility] Inhibitors Refill Protocol Passed - 2/28/2020  1:59 PM        Passed - PCP or prescribing provider visit in past 12 months       Last office visit with prescriber/PCP: 7/31/2019 Solomon Wilson MD OR same dept: 7/31/2019 Solomon Wilson MD OR same specialty: 7/31/2019 Solomon Wilson MD  Last physical: 1/31/2020 Last MTM visit: Visit date not found   Next visit within 3 mo: Visit date not found  Next physical within 3 mo: Visit date not found  Prescriber OR PCP: Solomon Wilson MD  Last diagnosis associated with med order: 1. Essential hypertension  - amLODIPine-benazepril (LOTREL) 10-40 mg per capsule [Pharmacy Med Name: AMLODIPINE-BENAZEPRIL 10-40 MG]; TAKE 1 CAPSULE BY MOUTH EVERY DAY  Dispense: 90 capsule; Refill: 2    If protocol passes may refill for 12 months if within 3 months of last provider visit (or a total of 15 months).             Passed - Serum Potassium in past 12 months     Lab Results   Component Value Date    Potassium 3.9 01/31/2020             Passed - Blood pressure filed in past 12 months     BP Readings from Last 1 Encounters:   02/10/20 130/84             Passed - Serum Creatinine in past 12 months     Creatinine   Date Value Ref Range Status   01/31/2020 1.34 (H) 0.70 - 1.30 mg/dL Final

## 2021-06-06 NOTE — PATIENT INSTRUCTIONS - HE
Triamcinolone injection    Brand Names: Aristospan, Arze-Ject-A, Kenalog, Triamonide, Triesence   What is this medicine?  TRIAMCINOLONE (trye am SIN oh lone) is a corticosteroid. It helps to reduce swelling, redness, itching, and allergic reactions. This medicine is used to treat allergies, arthritis, asthma, skin problems, and many other conditions.  How should I use this medicine?  This medicine is injected by a health care professional. After your dose follow your doctor's instructions for your care.  Contact your pediatrician regarding the use of this medicine in children. Special care may be needed.  What side effects may I notice from receiving this medicine?  Side effects that you should report to your doctor or health care professional as soon as possible:  allergic reactions like skin rash, itching or hives, swelling of the face, lips, or tongue  black, tarry stools  changes in emotions or moods  changes in vision  eye pain  increased blood pressure  increased joint pain and swelling at site where injected  lumpy, thin skin at site where injected  signs and symptoms of high blood sugar such as dizziness; dry mouth; dry skin; fruity breath; nausea; stomach pain; increased hunger or thirst; increased urination  rounding of face  seizures  signs and symptoms of infection like fever or chills; cough; sore throat; pain or trouble passing urine  slow growth in children (if used for longer periods of time)  sores that do not heal  stomach pain  swelling of ankles, feet, hands  trouble sleeping  unusual bleeding or bruising  unusual increased growth of hair on the face or body  unusually weak or tired  Side effects that usually do not require medical attention (report to your doctor or health care professional if they continue or are bothersome):  headache  increased appetite  nausea  pain, redness, or irritation at site where injected  upset stomach  weight gain  What may interact with this medicine?  antiviral  medicines for HIV or AIDS  aspirin  certain medicines for fungal infections like ketoconazole and itraconazole  clarithromycin  mifepristone  nefazodone  other steroid medicines  vaccines and other immunization products  What if I miss a dose?  This does not apply.  Where should I keep my medicine?  Keep out of the reach of children.  Store at room temperature between 20 and 25 degrees C (68 and 77 degrees F). Do not freeze. Protect from temperatures below 20 degrees C (68 degrees F). Protect from light. Keep in the original container. Store vial upright. Throw away any unused medicine after the expiration date.  What should I tell my health care provider before I take this medicine?  They need to know if you have any of these conditions:  Cushing's syndrome  diabetes  glaucoma  heart disease  high blood pressure  infection, like tuberculosis, herpes, measles, chickenpox, or fungal infection  liver disease  low levels of potassium in the blood  mental illness  myasthenia gravis  osteoporosis  recent heart attack  seizures  stomach or intestine disease  thyroid disease  an unusual or allergic reaction to triamcinolone, corticosteroids, benzyl alcohol, other medicines, foods, dyes, or preservatives  pregnant or trying to get pregnant  breast-feeding  What should I watch for while using this medicine?  Visit your doctor or health care professional for regular checks on your progress. If you are diabetic, check your blood sugar as directed. If you are taking this medicine for a long time, carry an identification card with your name, the type and dose of medicine, and your doctor's name and address.  You may need to be on a special diet while taking this medicine. Talk to your doctor.  Do not come in contact with people who have chickenpox or the measles while you are taking this medicine. If you do, call your doctor right away.  NOTE:This sheet is a summary. It may not cover all possible information. If you have  questions about this medicine, talk to your doctor, pharmacist, or health care provider. Copyright  2018 [employer]

## 2021-06-06 NOTE — PROGRESS NOTES
FOOT AND ANKLE SURGERY/PODIATRY CONSULT NOTE         ASSESSMENT:   Neuroma right foot       TREATMENT:  -There is pain to palpation along the 2nd IMS on the right foot consistent with a neuroma. No pain along the associated MPJ's. I reviewed treatment options for a neuroma today including oral anti-inflammatory medication, orthotics with a metatarsal pad and steroid injection. He would like to try a steroid injection today.    -Injected 1 cc Kenalog 40/0.5 cc 2% lidocaine plain 2nd IMS right. Patient tolerated the procedure well. Band aid applied.     -He will monitor symptoms and return as concerns develop.     Jaxon Swain DPM  [facility] Podiat[facility]/Foot & Ankle Surgery      HPI: I was asked to see Rufino Turner today at the request of Dr. Wilson for a neuroma on his right foot. The patient started experiencing pain 1-2 months ago along the right forefoot. Denies trauma or previous treatment. He denies changes in activity level. 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 PMH reviewed.     No past medical history on file.    No past surgical history on file.    Allergies   Allergen Reactions     Contrast [Iodixanol]      Shrimp          Current Outpatient Medications:      amLODIPine-benazepril (LOTREL) 10-40 mg per capsule, Take 1 capsule by mouth daily., Disp: 90 capsule, Rfl: 3     betamethasone dipropionate (DIPROLENE) 0.05 % cream, apply topically sparingly to affected areas twice daily as needed, Disp: 45 g, Rfl: 3     metoprolol succinate (TOPROL-XL) 50 MG 24 hr tablet, Take 1 tablet (50 mg total) by mouth daily., Disp: 90 tablet, Rfl: 1    No family history on file.    Social History     Socioeconomic History     Marital status:      Spouse name: Not on file     Number of children: Not on file     Years of education: Not on file     Highest education level: Not on file   Occupational History     Not on file   Social Needs     Financial resource strain: Not on file      Food insecurity:     Worry: Not on file     Inability: Not on file     Transportation needs:     Medical: Not on file     Non-medical: Not on file   Tobacco Use     Smoking status: Former Smoker     Last attempt to quit: 2/15/2018     Years since quittin.9     Smokeless tobacco: Never Used   Substance and Sexual Activity     Alcohol use: Not on file     Drug use: Not on file     Sexual activity: Not on file   Lifestyle     Physical activity:     Days per week: Not on file     Minutes per session: Not on file     Stress: Not on file   Relationships     Social connections:     Talks on phone: Not on file     Gets together: Not on file     Attends Evangelical service: Not on file     Active member of club or organization: Not on file     Attends meetings of clubs or organizations: Not on file     Relationship status: Not on file     Intimate partner violence:     Fear of current or ex partner: Not on file     Emotionally abused: Not on file     Physically abused: Not on file     Forced sexual activity: Not on file   Other Topics Concern     Not on file   Social History Narrative     Not on file       Review of Systems - 10 point Review of Systems is negative except for right foot pain which is noted in HPI.    OBJECTIVE:  Appearance: alert, well appearing, and in no distress.    Vitals:    02/10/20 1347   BP: 130/84   Pulse: 74   Resp: 16   Temp: 97.5  F (36.4  C)       BMI= Body mass index is 35.15 kg/m .    General appearance: Patient is alert and fully cooperative with history & exam.  No sign of distress is noted during the visit.     Psychiatric: Affect is pleasant & appropriate.  Patient appears motivated to improve health.     Respiratory: Breathing is regular & unlabored while sitting.     HEENT: Hearing is intact to spoken word.  Speech is clear.  No gross evidence of visual impairment that would impact ambulation.      Vascular: Dorsalis pedis and posterior tibial pulses are palpable. There is pedal hair  growth right.  CFT < 3 sec from anterior tibial surface to distal digits right. There is no appreciable edema noted.  Dermatologic: Turgor and texture are within normal limits. No coloration or temperature changes. No primary or secondary lesions noted.  Neurologic: All epicritic and proprioceptive sensations are grossly intact right.  Musculoskeletal: Pain to palpation 2nd IMS right. No pain 2nd or 3rd MPJ or 3rd IMS right.     Imaging:     No results found.

## 2021-06-14 NOTE — TELEPHONE ENCOUNTER
RN cannot approve Refill Request    RN can NOT refill this medication med is not covered by policy/route to provider. Last office visit: 7/31/2019 Solomon Wilson MD Last Physical: 1/31/2020 Last MTM visit: Visit date not found Last visit same specialty: 7/31/2019 Solomon Wilson MD.  Next visit within 3 mo: Visit date not found  Next physical within 3 mo: Visit date not found      Althea Ramos, [facility] Triage/Med Refill 1/9/2021    Requested Prescriptions   Pending Prescriptions Disp Refills     betamethasone dipropionate (DEL-BETA) 0.05 % cream [Pharmacy Med Name: BETAMETHASONE DP 0.05% CRM] 45 g 1     Sig: APPLY SPARINGLY TO AFFECTED AREAS TWICE DAILY AS NEEDED       There is no refill protocol information for this order

## 2021-06-15 NOTE — PROGRESS NOTES
Assessment:     1. Routine general medical examination at a health care facility  Tdap vaccine greater than or equal to 8yo IM    Pneumococcal polysaccharide vaccine 23-valent 1 yo or older, subq/IM   2. Obesity  Glycosylated Hemoglobin A1c    HDL Cholesterol    Cholesterol, Total   3. Accelerated hypertension  Comprehensive Metabolic Panel    losartan-hydrochlorothiazide (HYZAAR) 100-25 mg per tablet    Thyroid Stimulating Hormone (TSH)    HM2(CBC w/o Differential)    Electrocardiogram Perform and Read    Magnesium   4. Psoriasis  betamethasone dipropionate (DIPROLENE) 0.05 % cream        Plan:      Routine healthcare maintenance.  Preventative cares reviewed.  Establishment of cares.  Adacel booster and Pneumovax immunizations provided today.  Check total and HDL cholesterol otherwise A1c as well regarding underlying obesity.  Weight goal will remain less than 190 pounds initially, less than 180 pounds ideally.  Do not start exercise program currently however dietary changes can be implemented immediately until blood pressure under better control.    The following high BMI interventions were performed this visit: encouragement to exercise, weight monitoring, weight loss from baseline weight and lifestyle education regarding diet.  Weight goal < 190 pounds initially, < 180 pounds ideally.     Accelerated hypertension reviewed with blood pressure 172/124 on recheck.  Did check comprehensive metabolic panel, TSH, magnesium and CBC.  Patient otherwise asymptomatic.  Electrocardiogram normal without LVH etc.  Initiate losartan hydrochlorothiazide 100/25 1 tablet daily.  Will ensure normal renal function with today's labs before initiating.  Secondary causes of hypertension also will need to be excluded if not responding appropriately.  Continues to abstain from alcohol.  Smoking cessation efforts were also discussed with current 3-4 cigarettes per day.  Avoid caffeinated beverages including Monster energy drinks,  caffeinated coffee etc.  Blood pressure recheck no later than 1 week in office, sooner with concerns.    Psoriasis bilateral anterior shins.  Betamethasone dipropionate 0.05% cream sparingly twice daily to affected areas as needed.      Subjective:      Rufino Turner is a 42 y.o. male who presents for an annual exam.  Establishment of cares.  Obesity noted with significant weight gain since marriage.  Used to play football.  Family history of hypertension patient's father.  Patient denies any current concerns for chest pain or shortness of breath.  No exercise intolerance.  Had dental issues and needed wisdom tooth extraction apparently however due to elevated blood pressure of 190 systolic told that he needed to improve blood pressure control prior to completing wisdom tooth extraction etc.  Does work nights and drinks Monster energy drink as well as coffee.  Has had rash on anterior shins as well in the last several years.  OTC products with some improvement however it keeps coming back.  Does smoke quarter pack per day.  Dad with hypertension.  No urinary symptoms.  Comprehensive review of systems as above otherwise all negative.     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  Smoke - 3-4 cig a day  Alcoholic beverages - very rare  No regular exercise  Surgeries - [employer] - 34 years old  Hospitalizations:  viral meningitis  Family hx  Mother - living -   Father -  - age 50's hepatitis B, HTN  Siblings - 3 living -    Healthy Habits:   Regular Exercise: No  Healthy Diet: No  Dental Visits Regularly: Yes  Seat Belt: Yes   Sexually active: Yes  Colonoscopy: N/A  Lipid Profile: No  Glucose Screen: uncertain    Immunization History   Administered Date(s) Administered     Influenza, inj, historic,unspecified 2017     Pneumo Polysac 23-V 2018     Immunization status: up to date and documented, unknown  "status, patient to bring shot records.  Vision Screening:both eyes  Hearing: PASS     Current Outpatient Prescriptions   Medication Sig Dispense Refill     betamethasone dipropionate (DIPROLENE) 0.05 % cream apply topically sparingly to affected areas twice daily as needed 45 g 2     losartan-hydrochlorothiazide (HYZAAR) 100-25 mg per tablet Take 1 tablet by mouth daily. 30 tablet 2     No current facility-administered medications for this visit.      History reviewed. No pertinent past medical history.  History reviewed. No pertinent surgical history.  Contrast [iodixanol] and Shrimp  History reviewed. No pertinent family history.  Social History     Social History     Marital status:      Spouse name: N/A     Number of children: N/A     Years of education: N/A     Occupational History     Not on file.     Social History Main Topics     Smoking status: Current Every Day Smoker     Smokeless tobacco: Never Used     Alcohol use Not on file     Drug use: Not on file     Sexual activity: Not on file     Other Topics Concern     Not on file     Social History Narrative     No narrative on file       Review of Systems  Comprehensive ROS: as above, otherwise all negative.           Objective:     BP (!) 170/132  Pulse 88  Ht 5' 4.25\" (1.632 m)  Wt 208 lb (94.3 kg)  BMI 35.43 kg/m2  Body mass index is 35.43 kg/(m^2).    Physical    General Appearance:    Alert, cooperative, no distress, appears stated age.  Obesity.   Head:    Normocephalic, without obvious abnormality, atraumatic   Eyes:    PERRL, conjunctiva/corneas clear, EOM's intact, fundi     benign, both eyes        Ears:    Normal TM's and external ear canals, both ears   Nose:   Nares normal, septum midline, mucosa normal, no drainage    or sinus tenderness   Throat:   Lips, mucosa, and tongue normal; teeth and gums normal   Neck:   Supple, symmetrical, trachea midline, no adenopathy;        thyroid:  No enlargement/tenderness/nodules; no carotid    " bruit or JVD   Back:     Symmetric, no curvature, ROM normal, no CVA tenderness   Lungs:     Clear to auscultation bilaterally, respirations unlabored   Chest wall:    No tenderness or deformity   Heart:    Regular rate and rhythm, S1 and S2 normal, no murmur, rub   or gallop   Abdomen:     Soft, non-tender, bowel sounds active all four quadrants,     no masses, no organomegaly.     Genitalia:    Normal male without lesion, discharge or tenderness.  No inguinal hernia noted.     Rectal:    deferred   Extremities:   Extremities normal, atraumatic, no cyanosis or edema   Pulses:   2+ and symmetric all extremities   Skin:   Skin color, texture, turgor normal, no rashes.  Psoriatic plaques bilateral anterior shins without excoriation.   Lymph nodes:   Cervical, supraclavicular, and axillary nodes normal   Neurologic:   CNII-XII intact. Normal strength, sensation and reflexes       throughout

## 2021-06-16 PROBLEM — E66.01 CLASS 2 SEVERE OBESITY DUE TO EXCESS CALORIES WITH SERIOUS COMORBIDITY AND BODY MASS INDEX (BMI) OF 35.0 TO 35.9 IN ADULT (H): Status: ACTIVE | Noted: 2019-06-26

## 2021-06-16 PROBLEM — G57.61 MORTON'S NEUROMA OF RIGHT FOOT: Status: ACTIVE | Noted: 2020-02-10

## 2021-06-16 PROBLEM — E66.812 CLASS 2 SEVERE OBESITY DUE TO EXCESS CALORIES WITH SERIOUS COMORBIDITY AND BODY MASS INDEX (BMI) OF 35.0 TO 35.9 IN ADULT (H): Status: ACTIVE | Noted: 2019-06-26

## 2021-06-16 PROBLEM — G47.33 OSA (OBSTRUCTIVE SLEEP APNEA): Status: ACTIVE | Noted: 2019-07-29

## 2021-06-16 PROBLEM — I10 HTN (HYPERTENSION), BENIGN: Status: ACTIVE | Noted: 2019-06-26

## 2021-06-16 NOTE — PROGRESS NOTES
Assessment:    1. Essential hypertension     2. Renal insufficiency     3. Impaired fasting glucose           Plan:    Essential hypertension, improved control with accelerated hypertension 2018 reviewed.  Started on losartan hydrochlorothiazide 100/25 using 1 tablet daily and tolerating well without side effects.  Blood pressure on recheck 110/78.  Will continue current medication.  Noted mild renal insufficiency with GFR 60 and creatinine 1.33.  Prediabetes with A1c of 6.1%.  Patient will follow-up in 4 weeks for blood pressure recheck as well as fasting labs including lipid cascade, basic metabolic panel etc.  Remainder of lab panel from 2018 appears unremarkable.        Subjective:    Rufino Turner is seen today for follow-up hypertension.  Had been seen 2018 for physical exam and noted accelerated hypertension at that time with blood pressure 172/124.  Lab evaluation including comprehensive metabolic panel, TSH, magnesium and CBC were obtained.  Mild cholesterol elevation only.  A1c of 6.1%.  Renal insufficiency with creatinine 1.33 and GFR 60 present.  Discussed obesity status and recommendation for weight goal less than 190 pounds initially, less than 180 pounds ideally.  Comprehensive review of systems as above otherwise all negative.     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     No family history on file.     No past medical history on file.     Social History   Substance Use Topics     Smoking status: Current Every Day Smoker     Smokeless tobacco: Never Used     Alcohol use None        Current Outpatient Prescriptions    Medication Sig Dispense Refill     betamethasone dipropionate (DIPROLENE) 0.05 % cream apply topically sparingly to affected areas twice daily as needed 45 g 2     losartan-hydrochlorothiazide (HYZAAR) 100-25 mg per tablet Take 1 tablet by mouth daily. 30 tablet 2     No current facility-administered medications for this visit.           Objective:    Vitals:    02/15/18 1601 02/15/18 1756   BP: 120/90 110/78   Pulse: 100    Weight: 206 lb (93.4 kg)       Body mass index is 35.09 kg/(m^2).    Alert.  No apparent distress.  Blood pressure on recheck 110/78.  Chest clear.  Cardiac exam regular rate and rhythm without murmur or ectopy.  Extremities warm and dry without edema.  BMI 35.

## 2021-06-16 NOTE — PROGRESS NOTES
Assessment/Plan:    1. Essential hypertension  Blood pressure recheck 124/88 while on losartan hydrochlorothiazide 100/25 1 tablet daily.  Did encourage patient to continue current medication and encouraged therapeutic lifestyle changes for weight goal less than 200 pounds initially, less than 180 pounds ideally.  Check basic metabolic panel to ensure stable mild renal insufficiency or improvement.  Avoid OTC NSAIDs.  Blood pressure recheck in office no later than 3 months.  - Basic Metabolic Panel    2. Renal insufficiency  Basic metabolic panel to ensure stable or resolved mild renal insufficiency with prior creatinine 1.33 and GFR 60.  - Basic Metabolic Panel    3. Impaired fasting glucose  Prior A1c of 6.1% February 8, 2018.  Check fasting glucose today.  Reassess at follow-up in 3 months.  Therapeutic lifestyle changes reviewed.  - Basic Metabolic Panel    4. Hypercholesteremia  History of mild cholesterol elevation at 220 with HDL 44 February 8, 2018.  Will check lipid cascade for further evaluation today while fasting.  - Lipid Cascade    5. Dermatitis  Referral was placed for dermatitis bilateral anterior shins only mildly improved with betamethasone 0.05% cream.  - Ambulatory referral to Dermatology          Subjective:    Rufino Turner is seen today for follow-up evaluation.  Accelerated hypertension.  Previous and noted February 8, 2016.  Started on losartan hydrochlorothiazide 100/25 using 1 tab daily with blood pressure on recheck 110/78 at follow-up visit February 15, 2018.  Has gained 3 pounds unfortunately since that time and have discussed need for dietary and exercise modification for desired weight loss.  A1c was slightly elevated at 6.1% with impaired fasting glucose.  Mild renal insufficiency with GFR 60 and creatinine 1.33.  Lab evaluation otherwise appeared unremarkable.  No headache.  Bilateral shin dermatitis.  Betamethasone being used.  Slight improvement only.  Has not seen dermatologist.   Less pruritus.     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 No pertinent surgical history.     History reviewed. No pertinent family history.     History reviewed. No pertinent past medical history.     Social History   Substance Use Topics     Smoking status: Former Smoker     Quit date: 2/15/2018     Smokeless tobacco: Never Used     Alcohol use None        Current Outpatient Prescriptions   Medication Sig Dispense Refill     betamethasone dipropionate (DIPROLENE) 0.05 % cream apply topically sparingly to affected areas twice daily as needed 45 g 2     losartan-hydrochlorothiazide (HYZAAR) 100-25 mg per tablet Take 1 tablet by mouth daily. 30 tablet 2     No current facility-administered medications for this visit.           Objective:    Vitals:    03/15/18 1557 03/15/18 1638   BP: (!) 120/100 124/88   Pulse: 88    Weight: 209 lb (94.8 kg)       Body mass index is 35.6 kg/(m^2).    Alert.  No apparent distress.  Blood pressure 124/88.  Hyperpigmented lesions on anterior shins bilateral consistent with dermatitis question psoriatic plaque.  Chest clear.  Cardiac exam regular

## 2021-06-21 DIAGNOSIS — G47.33 OBSTRUCTIVE SLEEP APNEA (ADULT) (PEDIATRIC): Primary | ICD-10-CM

## 2021-07-24 ENCOUNTER — HEALTH MAINTENANCE LETTER (OUTPATIENT)
Age: 46
End: 2021-07-24

## 2021-09-18 ENCOUNTER — HEALTH MAINTENANCE LETTER (OUTPATIENT)
Age: 46
End: 2021-09-18

## 2021-10-30 ENCOUNTER — IMMUNIZATION (OUTPATIENT)
Dept: FAMILY MEDICINE | Facility: CLINIC | Age: 46
End: 2021-10-30
Payer: COMMERCIAL

## 2021-10-30 PROCEDURE — 90686 IIV4 VACC NO PRSV 0.5 ML IM: CPT

## 2021-10-30 PROCEDURE — 90471 IMMUNIZATION ADMIN: CPT

## 2021-11-15 ENCOUNTER — IMMUNIZATION (OUTPATIENT)
Dept: NURSING | Facility: CLINIC | Age: 46
End: 2021-11-15
Payer: COMMERCIAL

## 2021-11-15 PROCEDURE — 91300 PR COVID VAC PFIZER DIL RECON 30 MCG/0.3 ML IM: CPT

## 2021-11-15 PROCEDURE — 0004A PR COVID VAC PFIZER DIL RECON 30 MCG/0.3 ML IM: CPT

## 2022-06-29 ENCOUNTER — OFFICE VISIT (OUTPATIENT)
Dept: INTERNAL MEDICINE | Facility: CLINIC | Age: 47
End: 2022-06-29
Payer: COMMERCIAL

## 2022-06-29 ENCOUNTER — HOSPITAL ENCOUNTER (OUTPATIENT)
Dept: GENERAL RADIOLOGY | Facility: HOSPITAL | Age: 47
Discharge: HOME | End: 2022-06-29
Attending: PEDIATRICS
Payer: COMMERCIAL

## 2022-06-29 ENCOUNTER — HOSPITAL ENCOUNTER (OUTPATIENT)
Dept: CT IMAGING | Facility: HOSPITAL | Age: 47
Discharge: HOME | End: 2022-06-29
Attending: PEDIATRICS
Payer: COMMERCIAL

## 2022-06-29 VITALS
DIASTOLIC BLOOD PRESSURE: 100 MMHG | OXYGEN SATURATION: 97 % | BODY MASS INDEX: 34.58 KG/M2 | TEMPERATURE: 102.2 F | RESPIRATION RATE: 24 BRPM | WEIGHT: 204.6 LBS | HEART RATE: 99 BPM | SYSTOLIC BLOOD PRESSURE: 160 MMHG

## 2022-06-29 DIAGNOSIS — R50.9 FEVER, UNSPECIFIED FEVER CAUSE: Primary | ICD-10-CM

## 2022-06-29 DIAGNOSIS — R50.9 FEVER, UNSPECIFIED FEVER CAUSE: ICD-10-CM

## 2022-06-29 DIAGNOSIS — I10 HTN (HYPERTENSION), BENIGN: ICD-10-CM

## 2022-06-29 LAB
ALBUMIN SERPL BCG-MCNC: 4.1 G/DL (ref 3.5–5.2)
ALBUMIN UR-MCNC: 100 MG/DL
ALP SERPL-CCNC: 88 U/L (ref 40–129)
ALT SERPL W P-5'-P-CCNC: 34 U/L (ref 10–50)
AMORPH CRY #/AREA URNS HPF: ABNORMAL /HPF
ANION GAP SERPL CALCULATED.3IONS-SCNC: 11 MMOL/L (ref 7–15)
APPEARANCE UR: CLEAR
AST SERPL W P-5'-P-CCNC: 43 U/L (ref 10–50)
BACTERIA #/AREA URNS HPF: ABNORMAL /HPF
BASOPHILS # BLD AUTO: 0 10E3/UL (ref 0–0.2)
BASOPHILS # BLD MANUAL: 0 10E3/UL (ref 0–0.2)
BASOPHILS NFR BLD AUTO: 0 %
BASOPHILS NFR BLD MANUAL: 0 %
BILIRUB SERPL-MCNC: 0.4 MG/DL
BILIRUB UR QL STRIP: NEGATIVE
BUN SERPL-MCNC: 14.9 MG/DL (ref 6–20)
CALCIUM SERPL-MCNC: 8.5 MG/DL (ref 8.6–10)
CHLORIDE SERPL-SCNC: 102 MMOL/L (ref 98–107)
COLOR UR AUTO: YELLOW
CREAT SERPL-MCNC: 1.41 MG/DL (ref 0.67–1.17)
CRP SERPL-MCNC: 40.1 MG/L
DEPRECATED HCO3 PLAS-SCNC: 23 MMOL/L (ref 22–29)
EOSINOPHIL # BLD AUTO: 0 10E3/UL (ref 0–0.7)
EOSINOPHIL # BLD MANUAL: 0 10E3/UL (ref 0–0.7)
EOSINOPHIL NFR BLD AUTO: 0 %
EOSINOPHIL NFR BLD MANUAL: 0 %
ERYTHROCYTE [DISTWIDTH] IN BLOOD BY AUTOMATED COUNT: 12.7 % (ref 10–15)
ERYTHROCYTE [DISTWIDTH] IN BLOOD BY AUTOMATED COUNT: 13.2 % (ref 10–15)
ERYTHROCYTE [SEDIMENTATION RATE] IN BLOOD BY WESTERGREN METHOD: 20 MM/HR (ref 0–15)
GFR SERPL CREATININE-BSD FRML MDRD: 63 ML/MIN/1.73M2
GLUCOSE SERPL-MCNC: 98 MG/DL (ref 70–99)
GLUCOSE UR STRIP-MCNC: NEGATIVE MG/DL
HCT VFR BLD AUTO: 42.4 % (ref 40–53)
HCT VFR BLD AUTO: 44.5 % (ref 40–53)
HGB BLD-MCNC: 14.5 G/DL (ref 13.3–17.7)
HGB BLD-MCNC: 14.7 G/DL (ref 13.3–17.7)
HGB UR QL STRIP: ABNORMAL
IMM GRANULOCYTES # BLD: 0 10E3/UL
IMM GRANULOCYTES NFR BLD: 0 %
KETONES UR STRIP-MCNC: NEGATIVE MG/DL
LDH SERPL L TO P-CCNC: 485 U/L (ref 0–250)
LEUKOCYTE ESTERASE UR QL STRIP: NEGATIVE
LYMPHOCYTES # BLD AUTO: 0.8 10E3/UL (ref 0.8–5.3)
LYMPHOCYTES # BLD MANUAL: 0.8 10E3/UL (ref 0.8–5.3)
LYMPHOCYTES NFR BLD AUTO: 32 %
LYMPHOCYTES NFR BLD MANUAL: 32 %
MCH RBC QN AUTO: 27.7 PG (ref 26.5–33)
MCH RBC QN AUTO: 28.3 PG (ref 26.5–33)
MCHC RBC AUTO-ENTMCNC: 33 G/DL (ref 31.5–36.5)
MCHC RBC AUTO-ENTMCNC: 34.2 G/DL (ref 31.5–36.5)
MCV RBC AUTO: 83 FL (ref 78–100)
MCV RBC AUTO: 84 FL (ref 78–100)
MONOCYTES # BLD AUTO: 0.2 10E3/UL (ref 0–1.3)
MONOCYTES # BLD MANUAL: 0.1 10E3/UL (ref 0–1.3)
MONOCYTES NFR BLD AUTO: 9 %
MONOCYTES NFR BLD MANUAL: 2 %
MUCOUS THREADS #/AREA URNS LPF: PRESENT /LPF
NEUTROPHILS # BLD AUTO: 1.5 10E3/UL (ref 1.6–8.3)
NEUTROPHILS # BLD MANUAL: 1.7 10E3/UL (ref 1.6–8.3)
NEUTROPHILS NFR BLD AUTO: 58 %
NEUTROPHILS NFR BLD MANUAL: 66 %
NITRATE UR QL: NEGATIVE
PH UR STRIP: 6 [PH] (ref 5–8)
PLAT MORPH BLD: ABNORMAL
PLATELET # BLD AUTO: 133 10E3/UL (ref 150–450)
PLATELET # BLD AUTO: 145 10E3/UL (ref 150–450)
POTASSIUM SERPL-SCNC: 3.9 MMOL/L (ref 3.4–5.3)
PROT SERPL-MCNC: 7.6 G/DL (ref 6.4–8.3)
RBC # BLD AUTO: 5.13 10E6/UL (ref 4.4–5.9)
RBC # BLD AUTO: 5.31 10E6/UL (ref 4.4–5.9)
RBC #/AREA URNS AUTO: ABNORMAL /HPF
RBC MORPH BLD: ABNORMAL
RETICS # AUTO: 0.04 10E6/UL (ref 0.01–0.11)
RETICS/RBC NFR AUTO: 0.7 % (ref 0.8–2.7)
SARS-COV-2 RNA RESP QL NAA+PROBE: NEGATIVE
SODIUM SERPL-SCNC: 136 MMOL/L (ref 136–145)
SP GR UR STRIP: 1.02 (ref 1–1.03)
SQUAMOUS #/AREA URNS AUTO: ABNORMAL /LPF
UROBILINOGEN UR STRIP-ACNC: 0.2 E.U./DL
VARIANT LYMPHS BLD QL SMEAR: PRESENT
WBC # BLD AUTO: 2.5 10E3/UL (ref 4–11)
WBC # BLD AUTO: 2.5 10E3/UL (ref 4–11)
WBC #/AREA URNS AUTO: ABNORMAL /HPF

## 2022-06-29 PROCEDURE — 87389 HIV-1 AG W/HIV-1&-2 AB AG IA: CPT | Performed by: PEDIATRICS

## 2022-06-29 PROCEDURE — 80050 GENERAL HEALTH PANEL: CPT | Performed by: PEDIATRICS

## 2022-06-29 PROCEDURE — 86140 C-REACTIVE PROTEIN: CPT | Performed by: PEDIATRICS

## 2022-06-29 PROCEDURE — 85045 AUTOMATED RETICULOCYTE COUNT: CPT | Performed by: PEDIATRICS

## 2022-06-29 PROCEDURE — 85652 RBC SED RATE AUTOMATED: CPT | Performed by: PEDIATRICS

## 2022-06-29 PROCEDURE — 36415 COLL VENOUS BLD VENIPUNCTURE: CPT | Performed by: PEDIATRICS

## 2022-06-29 PROCEDURE — 74176 CT ABD & PELVIS W/O CONTRAST: CPT

## 2022-06-29 PROCEDURE — 81001 URINALYSIS AUTO W/SCOPE: CPT | Performed by: PEDIATRICS

## 2022-06-29 PROCEDURE — 86481 TB AG RESPONSE T-CELL SUSP: CPT | Performed by: PEDIATRICS

## 2022-06-29 PROCEDURE — 85060 BLOOD SMEAR INTERPRETATION: CPT | Performed by: PATHOLOGY

## 2022-06-29 PROCEDURE — 87040 BLOOD CULTURE FOR BACTERIA: CPT | Performed by: PEDIATRICS

## 2022-06-29 PROCEDURE — 83615 LACTATE (LD) (LDH) ENZYME: CPT | Performed by: PEDIATRICS

## 2022-06-29 PROCEDURE — 99214 OFFICE O/P EST MOD 30 MIN: CPT | Performed by: PEDIATRICS

## 2022-06-29 PROCEDURE — 86431 RHEUMATOID FACTOR QUANT: CPT | Performed by: PEDIATRICS

## 2022-06-29 PROCEDURE — U0005 INFEC AGEN DETEC AMPLI PROBE: HCPCS | Performed by: PEDIATRICS

## 2022-06-29 PROCEDURE — 86038 ANTINUCLEAR ANTIBODIES: CPT | Performed by: PEDIATRICS

## 2022-06-29 PROCEDURE — 71046 X-RAY EXAM CHEST 2 VIEWS: CPT | Mod: FY

## 2022-06-29 PROCEDURE — U0003 INFECTIOUS AGENT DETECTION BY NUCLEIC ACID (DNA OR RNA); SEVERE ACUTE RESPIRATORY SYNDROME CORONAVIRUS 2 (SARS-COV-2) (CORONAVIRUS DISEASE [COVID-19]), AMPLIFIED PROBE TECHNIQUE, MAKING USE OF HIGH THROUGHPUT TECHNOLOGIES AS DESCRIBED BY CMS-2020-01-R: HCPCS | Performed by: PEDIATRICS

## 2022-06-29 ASSESSMENT — ENCOUNTER SYMPTOMS: FEVER: 1

## 2022-06-29 ASSESSMENT — PAIN SCALES - GENERAL: PAINLEVEL: NO PAIN (0)

## 2022-06-29 NOTE — RESULT ENCOUNTER NOTE
Could you let patient know that his CT scan of the abdomen did not show any signs of any serious bacterial infection or abscess to explain his fevers. CXR was clear. Urine does not show signs of infection. Most of his blood work is still not back yet.     I strongly recommend that we get him scheduled with his PCP (or really anyone else) within 7 days to follow up on all of these blood tests. As I explained to him and his wife over the phone, today was my last day seeing patients at [facility] and while someone will cover my inbasket, he really needs a follow up to go over everything and discuss next steps.    Please assist scheduling as they declined MA helping schedule at time of discharge from clinic.    Please let me know if you have any questions or concerns,  Dr. Edgar

## 2022-06-29 NOTE — LETTER
Rufino Anderjesenia was seen and treated in our clinic on 6/29/2022. I recommend he remain out of work for a week, or until he is re-evaluated by his regular doctor in approximately a week.    Please let me know if you have any questions or concerns,      Dr. Marrero

## 2022-06-29 NOTE — PATIENT INSTRUCTIONS
We will start with lab (blood, urine, stool) and chest x-ray and CT abdomen today  Today is my last day with [facility]. I recommend you see your regular doctor within 1 week to follow up on everything. I will call with any urgent results that result before I leave [facility].    Restart your blood pressure medications.    Stay home from work until you see PCP. I have written letter for you.

## 2022-06-29 NOTE — RESULT ENCOUNTER NOTE
I sent another result message on this patient just now. Can you also let him know when you call that his COVID test is negative?    Dr. Edgar

## 2022-06-29 NOTE — PROGRESS NOTES
Assessment & Plan     Fever, unspecified fever cause  Unclear etiology of fever for 11 days now. His only symptoms are fever/chills and some mild diarrhea (1 loose stool a day and vomiting twice, last time yesterday). He did do rapid antigen test for COVID 2-3 days ago which was negative. He is vaccinated and no sick contacts. No travel. Unclear etiology, but very wide differential including infectious, rheumatologic, malignancy. Will start with labs, urine, stool and CXR. Due to new symptom in last 48 hours of nausea/vomiting, will obtain CT abdomen.  Unfortunately today is my last day with organization.  I have advised that he schedule a follow-up appointment within 7 days to go over all of his lab results and the next steps.  I discussed with him red flags for which to seek medical attention such as lightheadedness, lethargy, chest pain/SOB, or any other worrisome symptoms.  - CBC with platelets and differential; Future  - Comprehensive metabolic panel (BMP + Alb, Alk Phos, ALT, AST, Total. Bili, TP); Future  - XR Chest 2 Views; Future  - Blood Culture Peripheral Blood; Future  - ESR: Erythrocyte sedimentation rate; Future  - CRP, inflammation; Future  - UA Macro with Reflex to Micro and Culture - lab collect; Future  - Enteric Bacteria and Virus Panel by JIMMY Stool; Future  - Lactate Dehydrogenase; Future  - HIV Antigen Antibody Combo; Future  - Quantiferon TB Gold Plus; Future  - Rheumatoid factor; Future  - CT Abdomen Pelvis w/o Contrast; Future  - Anti Nuclear Mona IgG by IFA with Reflex; Future  - Symptomatic; Yes; 6/17/2022 COVID-19 Virus (Coronavirus) by PCR Nose  - UA Macro with Reflex to Micro and Culture - lab collect  - Enteric Bacteria and Virus Panel by JIMMY Stool    HTN (hypertension), benign  I advised that he restart his BP medications which he's put on hold over the last 11 days due to his febrile illness. Follow up with PCP within 1 week.    I cc'ed his PCP on this as today is my last day with  the organization and he will need close follow up. I asked our staff to help him schedule with his PCP within a week for follow-up.  I did give him a work letter telling him to stay home for work over the next week.    Return in about 1 week (around 7/6/2022).    Lupe Marrero MD  [facility] OCTAVIO Joy is a 46 year old accompanied by his spouse., presenting for the following health issues:  Fever (X2 weeks, highest fever 103.1 yesterday cold sweats, no cough or sore throat, vomiting on and off, diarrhea. )      Fever  Associated symptoms include a fever.   History of Present Illness       Reason for visit:  Cold sweats and high fever  Symptom onset:  1-2 weeks ago  Symptoms include:  Fevers  Symptom intensity:  Severe  Symptom progression:  Worsening  Had these symptoms before:  No  What makes it worse:  Food smells make me want to vomit  What makes it better:  Hot shower    He eats 0-1 servings of fruits and vegetables daily.He consumes 1 sweetened beverage(s) daily.He exercises with enough effort to increase his heart rate 60 or more minutes per day.  He exercises with enough effort to increase his heart rate 7 days per week. He is missing 2 dose(s) of medications per week.       He is here today with his wife. He has had a fever now over the last week and a half. Temperatures have been 101, now up to 103. Fevers typically are in the evening and then come back in the morning.  He has been alternating Tylenol and ibuprofen.  His last dose was this morning at around 3 AM.    He has had a little bit of diarrhea during this time.  He will have 1 loose stool a day.  No blood in his stools.  He has vomited twice, once 2 days ago and then once yesterday.  He has no abdominal pain.  He also has had chills.  No urinary symptoms.  No flank pain.  No headache, runny nose, sore throat, cough, chest pain, shortness of breath, or urinary symptoms.  He has had some pain in his right knee,  but no warmth, redness, or swelling of the knee.  He does have chronic rash on both of his shins which is not new.  It is not psoriasis.  He did see a dermatologist for it who gave him some kind of a cream, but it did not help.  He is supposed to go back for a biopsy at some point.    No travel recently  He has a children and wife.  Nobody else has been sick.  He has been vaccinated for COVID.  He did do a COVID rapid antigen test 2 to 3 days ago which was negative.    Because he was getting night sweats and chills, he did stop taking his blood pressure medications.    He does have sleep apnea.  He has had a history of stomach ulcers, but not recently.    Still a smoker, smokes around 4-5 cigarettes day. No alcohol or any other drug use, no IVDU.    Review of Systems   Constitutional: Positive for fever.          Objective    BP (!) 160/100 (BP Location: Right arm, Patient Position: Sitting, Cuff Size: Adult Large)   Pulse 99   Temp (!) 102.2  F (39  C) (Oral)   Resp 24   Wt 92.8 kg (204 lb 9.6 oz)   SpO2 97%   BMI 34.58 kg/m    Body mass index is 34.58 kg/m .  Physical Exam   GENERAL: alert, not toxic appearing  EYES: no conjunctival injection  HENT: ear canals and TM's normal, nose and mouth without ulcers or lesions  NECK: no adenopathy, no asymmetry, masses, or scars and thyroid normal to palpation  RESP: lungs clear to auscultation - no rales, rhonchi or wheezes  CV: regular rate and rhythm, normal S1 S2, no S3 or S4, no murmur, click or rub, no peripheral edema and peripheral pulses strong  ABDOMEN: soft, nontender, no hepatosplenomegaly, no masses and bowel sounds normal  MS: no gross musculoskeletal defects noted, no edema  SKIN: scaly plaque on bilateral shins  NEURO: Normal strength and tone, mentation intact and speech normal  PSYCH: mentation appears normal, affect normal/bright                    .  ..

## 2022-06-29 NOTE — Clinical Note
I believe they declined our help scheduling follow up with you and said they will schedule on their own. Just JUVENCIO.- Peace

## 2022-06-29 NOTE — Clinical Note
Antonino Wilson- I saw this patient of yours today for an acute issue. He's had fever up to 101-103 for almost 2 weeks now. I've ordered a lot of tests, but today is actually my last day with [facility] so he will need very close follow up. Of course my colleagues will look at my inbasket, but I've asked my staff and patient to get in to see you within a week to follow up. Please let me know if there's anything I can do. Thanks! - Peace

## 2022-06-30 ENCOUNTER — TELEPHONE (OUTPATIENT)
Dept: INTERNAL MEDICINE | Facility: CLINIC | Age: 47
End: 2022-06-30

## 2022-06-30 DIAGNOSIS — R50.9 FEVER, UNSPECIFIED FEVER CAUSE: Primary | ICD-10-CM

## 2022-06-30 LAB
ANA SER QL IF: NEGATIVE
C COLI+JEJUNI+LARI FUSA STL QL NAA+PROBE: NOT DETECTED
EC STX1 GENE STL QL NAA+PROBE: NOT DETECTED
EC STX2 GENE STL QL NAA+PROBE: NOT DETECTED
HIV 1+2 AB+HIV1 P24 AG SERPL QL IA: NONREACTIVE
NOROV GI+II ORF1-ORF2 JNC STL QL NAA+PR: NOT DETECTED
PATH REPORT.COMMENTS IMP SPEC: NORMAL
PATH REPORT.COMMENTS IMP SPEC: NORMAL
PATH REPORT.FINAL DX SPEC: NORMAL
PATH REPORT.MICROSCOPIC SPEC OTHER STN: NORMAL
PATH REPORT.MICROSCOPIC SPEC OTHER STN: NORMAL
PATH REPORT.RELEVANT HX SPEC: NORMAL
QUANTIFERON TB1 TUBE: 3.28 IU/ML
RHEUMATOID FACT SER NEPH-ACNC: 7 IU/ML
RVA NSP5 STL QL NAA+PROBE: NOT DETECTED
SALMONELLA SP RPOD STL QL NAA+PROBE: NOT DETECTED
SHIGELLA SP+EIEC IPAH STL QL NAA+PROBE: NOT DETECTED
TSH SERPL DL<=0.005 MIU/L-ACNC: 1.93 UIU/ML (ref 0.3–4.2)
V CHOL+PARA RFBL+TRKH+TNAA STL QL NAA+PR: NOT DETECTED
Y ENTERO RECN STL QL NAA+PROBE: NOT DETECTED

## 2022-06-30 PROCEDURE — 87506 IADNA-DNA/RNA PROBE TQ 6-11: CPT | Performed by: PEDIATRICS

## 2022-06-30 NOTE — RESULT ENCOUNTER NOTE
Hi,     I just tried calling you. So sorry, but today is my very last day being able to access the electronic medical record and so you won't be able to get into contact with me after today.    Thus far your inflammatory markers are high and your white blood count and platelets are low. There are still very many labs that are pending. I am glad you got something scheduled on 7/11 for follow up and next steps. If you are feeling unwell before then, please seek medical attention immediately. As we discussed, you may eventually need to be seen by an infectious disease, rheumatology, or hematologist, but I think it makes sense to see what all of the blood tests show first.    Again do not wait until your appointment on 7/11 to see someone if you are feeling worse at all (meaning feeling chest pain, short of breath, lightheaded, not tolerating any fluids/food, or having any abdominal pain, urinary symptoms, or any new symptoms).     Please let me know if you have any questions or concerns,  Dr. Edgar

## 2022-06-30 NOTE — TELEPHONE ENCOUNTER
06/30/22    I did call the on call hematologist to see if they think he needs urgent heme evaluation and/or empiric antibiotic for febrile illness x 2 weeks and leukopenia with mild neutropenia (ANC 1.5). Dr. Dumont was not impressed by the leukopenia and mild neutropenia. Thinks most likely viral.     At this time, both agree ID would be best place to start    I left voicemail on wife's voicemail stating I would place ID referral (I put urgent referral in) as his next appt isn't until 7/11 with another provider.    Could one of the RN's try calling again to convey message?    Dr. Edgar

## 2022-07-01 LAB
GAMMA INTERFERON BACKGROUND BLD IA-ACNC: 4.27 IU/ML
M TB IFN-G BLD-IMP: NEGATIVE
M TB IFN-G CD4+ BCKGRND COR BLD-ACNC: 5.73 IU/ML
MITOGEN IGNF BCKGRD COR BLD-ACNC: -0.31 IU/ML
MITOGEN IGNF BCKGRD COR BLD-ACNC: -0.99 IU/ML
QUANTIFERON MITOGEN: 10 IU/ML
QUANTIFERON NIL TUBE: 4.27 IU/ML
QUANTIFERON TB2 TUBE: 3.96

## 2022-07-01 NOTE — TELEPHONE ENCOUNTER
Called and spoke with patients wife, Valentina.  Informed Valentina of Dr. Edgar's message and recommendations below.  Writer informed Valentina that ID would be in contact with them regarding appt, etc.  Writer also provided Valentina with ID's phone number.      Valentina thanked for call.

## 2022-07-04 LAB — BACTERIA BLD CULT: NO GROWTH

## 2022-07-05 ENCOUNTER — OFFICE VISIT (OUTPATIENT)
Dept: INFECTIOUS DISEASES | Facility: CLINIC | Age: 47
End: 2022-07-05

## 2022-07-05 VITALS
BODY MASS INDEX: 33.97 KG/M2 | TEMPERATURE: 98.7 F | HEART RATE: 84 BPM | SYSTOLIC BLOOD PRESSURE: 150 MMHG | WEIGHT: 201 LBS | DIASTOLIC BLOOD PRESSURE: 112 MMHG

## 2022-07-05 DIAGNOSIS — R50.9 FEVER, UNSPECIFIED FEVER CAUSE: Primary | ICD-10-CM

## 2022-07-05 PROCEDURE — 99203 OFFICE O/P NEW LOW 30 MIN: CPT | Performed by: STUDENT IN AN ORGANIZED HEALTH CARE EDUCATION/TRAINING PROGRAM

## 2022-07-05 NOTE — PROGRESS NOTES
[facility] new patient note.    Name: Rufino Turner  :   1975  MRN:   [id number]  PCP:    Solomon Wilson  DOS:    22      CC: Fever    HPI/Interval History:  Rufino Turner is a 46 year old old male with the history of no significant past medical history who is in ID clinic for fever.  Onset of symptoms about a week and a week and half ago with cold chills associated with fever with temperature as high as 103.  He also had mild diarrhea.  No other symptoms such as headache, sore throat, cough, chest pain, shortness of breath, dysuria, abdominal pain, back pain, joint pain, skin rashes.  He reports some mild nausea when he was taking ibuprofen and Tylenol.  He lives locally and likes [place].  No hiking or camping.  No sick contact at home.  They have 8 kids at home.  He was seen recently and has some work-up done with negative LAMONT, RF.  Mild elevation of sed rate and CRP.  Liver function was normal.  UA was not consistent with UTI.  Blood cultures were no growth.  The only thing abnormal on his labs were thrombocytopenia and leukopenia.  Stool cultures negative  He now feels back to his normal self    ===========================  Past Medical History:  No significant past medical history.    Past Surgical History:  No significant past surgical history    Social History:  Social History     Socioeconomic History     Marital status:      Spouse name: Not on file     Number of children: Not on file     Years of education: Not on file     Highest education level: Not on file   Occupational History     Not on file   Tobacco Use     Smoking status: Current Every Day Smoker     Last attempt to quit: 2/15/2018     Years since quittin.3     Smokeless tobacco: Never Used   Vaping Use     Vaping Use: Never used   Substance and Sexual Activity     Alcohol use: Not Currently     Drug use: Never     Sexual activity: Not on file   Other Topics Concern     Not on file   Social History  Narrative     Not on file     Social Determinants of Health     Financial Resource Strain: Not on file   Food Insecurity: Not on file   Transportation Needs: Not on file   Physical Activity: Not on file   Stress: Not on file   Social Connections: Not on file   Intimate Partner Violence: Not on file   Housing Stability: Not on file       Family Medical History:  Family History   Problem Relation Age of Onset     Kidney failure Mother        Allergies:     Allergies   Allergen Reactions     Contrast [Iodixanol] Unknown     Shrimp [Shrimp Extract Allergy Skin Test] Unknown       Medications:  Current Outpatient Medications   Medication Sig Dispense Refill     amLODIPine-benazepril (LOTREL) 10-40 mg per capsule [AMLODIPINE-BENAZEPRIL (LOTREL) 10-40 MG PER CAPSULE] Take 1 capsule by mouth daily. 90 capsule 3     metoprolol succinate (TOPROL-XL) 50 MG 24 hr tablet [METOPROLOL SUCCINATE (TOPROL-XL) 50 MG 24 HR TABLET] Take 1 tablet (50 mg total) by mouth daily. 90 tablet 1       Immunizations:  Immunization History   Administered Date(s) Administered     COVID-19,PF,Pfizer (12+ Yrs) 04/20/2021, 05/11/2021, 11/15/2021     Flu, Unspecified 12/01/2017     Influenza Vaccine IM > 6 months Valent IIV4 (Alfuria,Fluzone) 11/08/2020, 10/30/2021     Influenza Vaccine, 6+MO IM (QUADRIVALENT W/PRESERVATIVES) 12/13/2019     Pneumococcal 23 valent 02/08/2018     Tdap (Adacel,Boostrix) 02/08/2018       ==================    Review of System:  12 points review of system is negative except for findings in the HPI.    Exam  VS: BP (!) 150/112   Pulse 84   Temp 98.7  F (37.1  C)   Wt 91.2 kg (201 lb)   BMI 33.97 kg/m      Gen: Pleasant in no acute distress.  HEENT: NCAT. EOMI.  Neck: No LAD.  Lungs: Clear to auscultation bilaterally with no crackles or wheezes.   Card: RRR. No RMG. Peripheral pulses present and symmetrical. No edema.   Abd: Soft NT ND. No hepatomegaly or splenomegaly.  Ext: No edema  Lymph: No cervical or supraclavicular  adenopathy.  Skin: No rash  Neuro: Alert and oriented to place time and person. Cranial nerves grossly intact.     Labs:  Reviewed as above.    Imaging:  Study Result    Narrative & Impression   EXAM: CT ABDOMEN PELVIS W/O CONTRAST  LOCATION: [facility]  DATE/TIME: 6/29/2022 12:55 PM     INDICATION: Fever for 2 weeks. Emesis now.  COMPARISON: 09/01/2010  TECHNIQUE: CT scan of the abdomen and pelvis was performed without IV contrast. Multiplanar reformats were obtained. Dose reduction techniques were used.  CONTRAST: None.     FINDINGS:   LOWER CHEST: Normal.     HEPATOBILIARY: Few small liver cysts again noted, easiest to appreciate on earlier study with contrast. These need no follow-up.     PANCREAS: Normal.     SPLEEN: Normal.     ADRENAL GLANDS: Normal.     KIDNEYS/BLADDER: NORMAL.     BOWEL: There are a few small nodes in the kira hepatis and about the celiac axis. These are bit more plump than in 2010 and the largest portacaval node measures 1 cm in short axis. Prior appendectomy. No free fluid or inflammatory changes. Colon is   essentially collapsed. Minimal stool in the right colon. No periserosal fat stranding or mucosal thickening.     LYMPH NODES: Normal.     VASCULATURE: Normal right common iliac artery calcifications. Ectatic abdominal aorta.     PELVIC ORGANS: Normal.     MUSCULOSKELETAL: Normal.                                                                      IMPRESSION:   1.  No abnormalities are seen to explain fever and emesis. Specifically, no bowel obstruction.  2.  Paucity of stool in the colon which is predominantly collapsed. Query enteritis? Certainly, no evidence of a colitis.  3.  Other noncritical findings as noted above.            Assessment:  Rufino Turner is a 46 year old old male with fever and cold chills without any other significant symptom for about 10 days.  Now feels back to normal.  Found on labs to have leukopenia and thrombocytopenia.  Most likely  a viral infection as the underlying etiology.  We will repeat CBC today to ensure leukopenia and thrombocytopenia have resolved.      Recommendations:  Assurance  CBC with differential  If still has thrombocytopenia and leukopenia we will consider tickborne illness work-up.      Cate Mcintyre MD  [facility]  [facility]  Office Telephone [phone].  Fax [phone]  [facility] paging

## 2022-07-05 NOTE — TELEPHONE ENCOUNTER
[employer] message sent for clarification of what medication is being requested. Jose Alfredo Valenzuela on 7/5/2022 at 10:29 AM

## 2022-08-20 ENCOUNTER — HEALTH MAINTENANCE LETTER (OUTPATIENT)
Age: 47
End: 2022-08-20

## 2022-10-21 ENCOUNTER — IMMUNIZATION (OUTPATIENT)
Dept: FAMILY MEDICINE | Facility: CLINIC | Age: 47
End: 2022-10-21
Payer: COMMERCIAL

## 2022-10-21 DIAGNOSIS — Z23 ENCOUNTER FOR ADMINISTRATION OF VACCINE: Primary | ICD-10-CM

## 2022-10-21 PROCEDURE — 90471 IMMUNIZATION ADMIN: CPT

## 2022-10-21 PROCEDURE — 0124A COVID-19,PF,PFIZER BOOSTER BIVALENT: CPT

## 2022-10-21 PROCEDURE — 91312 COVID-19,PF,PFIZER BOOSTER BIVALENT: CPT

## 2022-10-21 PROCEDURE — 99207 PR NO CHARGE NURSE ONLY: CPT

## 2022-10-21 PROCEDURE — 90686 IIV4 VACC NO PRSV 0.5 ML IM: CPT

## 2023-08-24 ENCOUNTER — TELEPHONE (OUTPATIENT)
Dept: NURSING | Facility: CLINIC | Age: 48
End: 2023-08-24
Payer: COMMERCIAL

## 2023-08-24 NOTE — TELEPHONE ENCOUNTER
Pt's wife calling. She is not with the patient. Pt is at work right now.     She is calling to get an appointment for patient for chest pain and HTN hx. She reports that patient has been having chest pain on and off more frequently than usual.     He does not take his BP medications regularly, but when he does he has less chest pain.     Writer advised caller to have patient call FNA when he is available and/or message his PCP about the sx to get further advisement in case it is recommended; based on sx, that patient is seen sooner than a clinic appt.     She was transferred to scheduling and reports that she will do so.     Consent to communicate is on file.     Tamra Ruiz RN  [facility] Nurse Advisor 11:07 AM 8/24/2023

## 2023-09-16 ENCOUNTER — HEALTH MAINTENANCE LETTER (OUTPATIENT)
Age: 48
End: 2023-09-16

## 2023-09-25 ENCOUNTER — OFFICE VISIT (OUTPATIENT)
Dept: FAMILY MEDICINE | Facility: CLINIC | Age: 48
End: 2023-09-25
Payer: COMMERCIAL

## 2023-09-25 VITALS
TEMPERATURE: 98.8 F | HEIGHT: 65 IN | DIASTOLIC BLOOD PRESSURE: 96 MMHG | SYSTOLIC BLOOD PRESSURE: 176 MMHG | OXYGEN SATURATION: 99 % | RESPIRATION RATE: 14 BRPM | WEIGHT: 207.9 LBS | HEART RATE: 83 BPM | BODY MASS INDEX: 34.64 KG/M2

## 2023-09-25 DIAGNOSIS — Z28.39 UNDERIMMUNIZATION STATUS: ICD-10-CM

## 2023-09-25 DIAGNOSIS — Z11.59 NEED FOR HEPATITIS C SCREENING TEST: ICD-10-CM

## 2023-09-25 DIAGNOSIS — Z12.11 SCREEN FOR COLON CANCER: ICD-10-CM

## 2023-09-25 DIAGNOSIS — I10 ESSENTIAL HYPERTENSION: ICD-10-CM

## 2023-09-25 DIAGNOSIS — F17.210 CIGARETTE NICOTINE DEPENDENCE WITHOUT COMPLICATION: ICD-10-CM

## 2023-09-25 DIAGNOSIS — R07.9 CHEST PAIN, UNSPECIFIED TYPE: Primary | ICD-10-CM

## 2023-09-25 LAB
ALBUMIN SERPL BCG-MCNC: 4.6 G/DL (ref 3.5–5.2)
ALP SERPL-CCNC: 73 U/L (ref 40–129)
ALT SERPL W P-5'-P-CCNC: 23 U/L (ref 0–70)
ANION GAP SERPL CALCULATED.3IONS-SCNC: 10 MMOL/L (ref 7–15)
AST SERPL W P-5'-P-CCNC: 25 U/L (ref 0–45)
BASOPHILS # BLD AUTO: 0.1 10E3/UL (ref 0–0.2)
BASOPHILS NFR BLD AUTO: 1 %
BILIRUB SERPL-MCNC: 0.3 MG/DL
BUN SERPL-MCNC: 21.5 MG/DL (ref 6–20)
CALCIUM SERPL-MCNC: 9.2 MG/DL (ref 8.6–10)
CHLORIDE SERPL-SCNC: 105 MMOL/L (ref 98–107)
CREAT SERPL-MCNC: 1.39 MG/DL (ref 0.67–1.17)
DEPRECATED HCO3 PLAS-SCNC: 24 MMOL/L (ref 22–29)
EGFRCR SERPLBLD CKD-EPI 2021: 63 ML/MIN/1.73M2
EOSINOPHIL # BLD AUTO: 0.2 10E3/UL (ref 0–0.7)
EOSINOPHIL NFR BLD AUTO: 3 %
ERYTHROCYTE [DISTWIDTH] IN BLOOD BY AUTOMATED COUNT: 12.8 % (ref 10–15)
GLUCOSE SERPL-MCNC: 93 MG/DL (ref 70–99)
HCT VFR BLD AUTO: 46.4 % (ref 40–53)
HCV AB SERPL QL IA: NONREACTIVE
HGB BLD-MCNC: 15.6 G/DL (ref 13.3–17.7)
IMM GRANULOCYTES # BLD: 0 10E3/UL
IMM GRANULOCYTES NFR BLD: 0 %
LYMPHOCYTES # BLD AUTO: 2.3 10E3/UL (ref 0.8–5.3)
LYMPHOCYTES NFR BLD AUTO: 29 %
MCH RBC QN AUTO: 28.8 PG (ref 26.5–33)
MCHC RBC AUTO-ENTMCNC: 33.6 G/DL (ref 31.5–36.5)
MCV RBC AUTO: 86 FL (ref 78–100)
MONOCYTES # BLD AUTO: 0.5 10E3/UL (ref 0–1.3)
MONOCYTES NFR BLD AUTO: 6 %
NEUTROPHILS # BLD AUTO: 5 10E3/UL (ref 1.6–8.3)
NEUTROPHILS NFR BLD AUTO: 62 %
PLATELET # BLD AUTO: 268 10E3/UL (ref 150–450)
POTASSIUM SERPL-SCNC: 4.1 MMOL/L (ref 3.4–5.3)
PROT SERPL-MCNC: 7.7 G/DL (ref 6.4–8.3)
RBC # BLD AUTO: 5.42 10E6/UL (ref 4.4–5.9)
SODIUM SERPL-SCNC: 139 MMOL/L (ref 136–145)
TSH SERPL DL<=0.005 MIU/L-ACNC: 3.17 UIU/ML (ref 0.3–4.2)
WBC # BLD AUTO: 8.1 10E3/UL (ref 4–11)

## 2023-09-25 PROCEDURE — 93010 ELECTROCARDIOGRAM REPORT: CPT | Performed by: INTERNAL MEDICINE

## 2023-09-25 PROCEDURE — 99214 OFFICE O/P EST MOD 30 MIN: CPT | Mod: 25 | Performed by: STUDENT IN AN ORGANIZED HEALTH CARE EDUCATION/TRAINING PROGRAM

## 2023-09-25 PROCEDURE — 90677 PCV20 VACCINE IM: CPT | Performed by: STUDENT IN AN ORGANIZED HEALTH CARE EDUCATION/TRAINING PROGRAM

## 2023-09-25 PROCEDURE — 86803 HEPATITIS C AB TEST: CPT | Performed by: STUDENT IN AN ORGANIZED HEALTH CARE EDUCATION/TRAINING PROGRAM

## 2023-09-25 PROCEDURE — 90471 IMMUNIZATION ADMIN: CPT | Performed by: STUDENT IN AN ORGANIZED HEALTH CARE EDUCATION/TRAINING PROGRAM

## 2023-09-25 PROCEDURE — 85025 COMPLETE CBC W/AUTO DIFF WBC: CPT | Performed by: STUDENT IN AN ORGANIZED HEALTH CARE EDUCATION/TRAINING PROGRAM

## 2023-09-25 PROCEDURE — 84443 ASSAY THYROID STIM HORMONE: CPT | Performed by: STUDENT IN AN ORGANIZED HEALTH CARE EDUCATION/TRAINING PROGRAM

## 2023-09-25 PROCEDURE — 36415 COLL VENOUS BLD VENIPUNCTURE: CPT | Performed by: STUDENT IN AN ORGANIZED HEALTH CARE EDUCATION/TRAINING PROGRAM

## 2023-09-25 PROCEDURE — 80061 LIPID PANEL: CPT | Performed by: STUDENT IN AN ORGANIZED HEALTH CARE EDUCATION/TRAINING PROGRAM

## 2023-09-25 PROCEDURE — 80053 COMPREHEN METABOLIC PANEL: CPT | Performed by: STUDENT IN AN ORGANIZED HEALTH CARE EDUCATION/TRAINING PROGRAM

## 2023-09-25 PROCEDURE — 90472 IMMUNIZATION ADMIN EACH ADD: CPT | Performed by: STUDENT IN AN ORGANIZED HEALTH CARE EDUCATION/TRAINING PROGRAM

## 2023-09-25 PROCEDURE — 93005 ELECTROCARDIOGRAM TRACING: CPT | Performed by: STUDENT IN AN ORGANIZED HEALTH CARE EDUCATION/TRAINING PROGRAM

## 2023-09-25 PROCEDURE — 90686 IIV4 VACC NO PRSV 0.5 ML IM: CPT | Performed by: STUDENT IN AN ORGANIZED HEALTH CARE EDUCATION/TRAINING PROGRAM

## 2023-09-25 PROCEDURE — 90746 HEPB VACCINE 3 DOSE ADULT IM: CPT | Performed by: STUDENT IN AN ORGANIZED HEALTH CARE EDUCATION/TRAINING PROGRAM

## 2023-09-25 NOTE — PROGRESS NOTES
Assessment & Plan     Chest pain, unspecified type  Mr. Turner  is a 48-year-old male smoker who presents today with typical anginal chest pain occurring intermittently for the past 2 months.  Pain is substernal in location, worsens with exercise, even with minimal exertion such as going up 1 flight of stairs or moving a 20 pound object 20 yards, and improves with rest within 3 to 5 minutes.    EKG today showed normal sinus rhythm with early repolarization, no other abnormalities on EKG.  Examination showed no abnormal findings.    Considered possibility of occlusive coronary artery disease per [place]isa model patient has 70% risk of this.  Given his intermediate risk level, I recommend that he has echo stress test done for which I put a referral.  Patient is agreeable to getting this done for risk stratification.    I recommend also getting lab work, a CMP, CBC and TSH.    Will obtain lipid level to assess ASCVD risk.    I gave patient return precautions, to go to the emergency room immediately if having severe chest pain that will not improve with 1 to 2 minutes of rest, leg swelling, worsening shortness of breath.  He reports understanding.  We will follow-up with patient after his lab work and echocardiogram stress test is finished.    If no abnormal findings, would suspect pulmonary etiology.    - EKG 12-lead, tracing only  - TSH with free T4 reflex  - Comprehensive metabolic panel (BMP + Alb, Alk Phos, ALT, AST, Total. Bili, TP)  - CBC with platelets and differential  - Echocardiogram Exercise Stress  - TSH with free T4 reflex  - Comprehensive metabolic panel (BMP + Alb, Alk Phos, ALT, AST, Total. Bili, TP)  - CBC with platelets and differential    Essential hypertension    Patient reports he has not been taking his blood pressure medications due to having issues with erections while on the medication.  He was previously on amlodipine 10 mg, benazepril 40 mg and metoprolol 50 mg daily.  Most likely that difficulty  with erection is occurring due to metoprolol use, I recommend that we discontinue the metoprolol, but that he should continue amlodipine 10 and benazepril 40 mg.  He should start those again today.  He agrees to do this.  He will take blood pressure readings at home for the next 5 days and send those to me via [employer].  At that time we will make further decisions on controlling blood pressure which is currently significantly out of control.    Screen for colon cancer  I recommend per USPSTF guidelines for colon cancer screening, patient reports understanding and wishes to get a colonoscopy referral, I placed this for the patient.  - Colonoscopy Screening  Referral    Need for hepatitis C screening test  Per USPSTF recommendations recommended hepatitis C screening, this was placed for the patient.  - Hepatitis C Screen Reflex to HCV RNA Quant and Genotype  - Hepatitis C Screen Reflex to HCV RNA Quant and Genotype    Cigarette nicotine dependence without complication  Patient is a cigarette smoker smoking approximately quarter pack per day for the past 20 or so years.  We discussed stopping smoking, he wishes to try to do this on his own.  I did offer pharmacotherapy and behavioral therapy however he declined this.  Recommended pneumococcal vaccination which he accepted.  We will address this again at next appointment, to see whether he was successful.  Did discuss with patient that cigarette smoking is harmful to his heart health and would be important for him to stop smoking.  He agrees.  - HEPATITIS B, ADULT 20+ (ENGERIX-B/RECOMBIVAX HB)  - Pneumococcal 20 Valent Conjugate (Prevnar 20)  - INFLUENZA VACCINE IM > 6 MONTHS VALENT IIV4 (AFLURIA/FLUZONE)    Underimmunization status    Per CDC recommendations recommend hepatitis B and influenza vaccination, which patient agreed to receive.  These were given to the patient today.  Patient tolerated well.             Nicotine/Tobacco Cessation:  He reports that he  "has been smoking cigarettes. He has been exposed to tobacco smoke. He has never used smokeless tobacco.  Nicotine/Tobacco Cessation Plan:   Information offered: Patient not interested at this time      BMI:   Estimated body mass index is 35.13 kg/m  as calculated from the following:    Height as of this encounter: 1.638 m (5' 4.5\").    Weight as of this encounter: 94.3 kg (207 lb 14.4 oz).   Weight management plan: Discussed healthy diet and exercise guidelines    Work on weight loss  Regular exercise    Bin Curtis MD  [facility]    Ramila Brock is a 48 year old, presenting for the following health issues:  Chest Pain (On and off center chest pain. Pain scale 7/10. Started 2 months ago./Hasn't been taking medications since 1 week ago.)        9/25/2023    10:39 AM   Additional Questions   Roomed by Rayne HALLMAN       History of Present Illness       Hypertension: He presents for follow up of hypertension.  He does not check blood pressure  regularly outside of the clinic. Outside blood pressures have been over 140/90. He does not follow a low salt diet.     He eats 2-3 servings of fruits and vegetables daily.He consumes 1 sweetened beverage(s) daily.He exercises with enough effort to increase his heart rate 30 to 60 minutes per day.  He exercises with enough effort to increase his heart rate 3 or less days per week. He is missing 4 dose(s) of medications per week.  He is not taking prescribed medications regularly due to side effects.       He feels chest tightness and pain after exertion, like going up stairs. Has been taking aspirin for this. Also has some shortness of breath with this as well.  Will have this with going up one flight of stairs or carrying 20 pounds more than 20 yards.  The pain is severe enough for make him to stop doing the activity. Substernal in location.  It will improve with rest fairly quickly.  This is ongoing for the past two months. When he takes his " "blood pressure medication it does not happen as often. Feels palpitations. If he puts pressure on chest it seems to improve pain. Reports some diaphoresis at time of pain.  Pain will last 3-5 minutes. There is no radiation of pain, denies leg edema. Denies nausea.     He has not been taking his blood pressure medication regularly for about four months. He has been having decreased sex drive and erections when using the medication.       Does smoke about 6 cigarettes a day for more than 20 years.               Review of Systems   Constitutional, HEENT, cardiovascular, pulmonary, gi and gu systems are negative, except as otherwise noted.      Objective    BP (!) 176/96   Pulse 83   Temp 98.8  F (37.1  C) (Oral)   Resp 14   Ht 1.638 m (5' 4.5\")   Wt 94.3 kg (207 lb 14.4 oz)   SpO2 99%   BMI 35.13 kg/m    Body mass index is 35.13 kg/m .  Physical Exam   GENERAL: healthy, alert and no distress  EYES: Eyes grossly normal to inspection, PERRL and conjunctivae and sclerae normal  NECK: no adenopathy, no asymmetry, masses, or scars and thyroid normal to palpation  RESP: lungs clear to auscultation - no rales, rhonchi or wheezes  CV: regular rate and rhythm, normal S1 S2, no S3 or S4, no murmur, click or rub, no peripheral edema and peripheral pulses strong  MS: no gross musculoskeletal defects noted, no edema  SKIN: no suspicious lesions or rashes  NEURO: Normal strength and tone, mentation intact and speech normal  PSYCH: mentation appears normal, affect normal/bright    Results for orders placed or performed in visit on 09/25/23   CBC with platelets and differential     Status: None   Result Value Ref Range    WBC Count 8.1 4.0 - 11.0 10e3/uL    RBC Count 5.42 4.40 - 5.90 10e6/uL    Hemoglobin 15.6 13.3 - 17.7 g/dL    Hematocrit 46.4 40.0 - 53.0 %    MCV 86 78 - 100 fL    MCH 28.8 26.5 - 33.0 pg    MCHC 33.6 31.5 - 36.5 g/dL    RDW 12.8 10.0 - 15.0 %    Platelet Count 268 150 - 450 10e3/uL    % Neutrophils 62 %    % " Lymphocytes 29 %    % Monocytes 6 %    % Eosinophils 3 %    % Basophils 1 %    % Immature Granulocytes 0 %    Absolute Neutrophils 5.0 1.6 - 8.3 10e3/uL    Absolute Lymphocytes 2.3 0.8 - 5.3 10e3/uL    Absolute Monocytes 0.5 0.0 - 1.3 10e3/uL    Absolute Eosinophils 0.2 0.0 - 0.7 10e3/uL    Absolute Basophils 0.1 0.0 - 0.2 10e3/uL    Absolute Immature Granulocytes 0.0 <=0.4 10e3/uL   EKG 12-lead, tracing only     Status: None (Preliminary result)   Result Value Ref Range    Systolic Blood Pressure  mmHg    Diastolic Blood Pressure  mmHg    Ventricular Rate 82 BPM    Atrial Rate 82 BPM    NE Interval 176 ms    QRS Duration 98 ms     ms    QTc 455 ms    P Axis 32 degrees    R AXIS 24 degrees    T Axis 14 degrees    Interpretation ECG       Sinus rhythm  Possible Inferior infarct , age undetermined  Abnormal ECG  When compared with ECG of 08-FEB-2018 15:50,  No significant change was found     CBC with platelets and differential     Status: None    Narrative    The following orders were created for panel order CBC with platelets and differential.  Procedure                               Abnormality         Status                     ---------                               -----------         ------                     CBC with platelets and d...[[id number]]                      Final result                 Please view results for these tests on the individual orders.       Recent Labs   Lab Test 01/31/20  0945 03/15/18  1643   CHOL 221* 222*   HDL 43 42   * 150*   TRIG 197* 150*

## 2023-09-26 LAB
ATRIAL RATE - MUSE: 82 BPM
CHOLEST SERPL-MCNC: 221 MG/DL
DIASTOLIC BLOOD PRESSURE - MUSE: NORMAL MMHG
HDLC SERPL-MCNC: 45 MG/DL
INTERPRETATION ECG - MUSE: NORMAL
LDLC SERPL CALC-MCNC: 135 MG/DL
NONHDLC SERPL-MCNC: 176 MG/DL
P AXIS - MUSE: 32 DEGREES
PR INTERVAL - MUSE: 176 MS
QRS DURATION - MUSE: 98 MS
QT - MUSE: 390 MS
QTC - MUSE: 455 MS
R AXIS - MUSE: 24 DEGREES
SYSTOLIC BLOOD PRESSURE - MUSE: NORMAL MMHG
T AXIS - MUSE: 14 DEGREES
TRIGL SERPL-MCNC: 207 MG/DL
VENTRICULAR RATE- MUSE: 82 BPM

## 2023-10-10 ENCOUNTER — TELEPHONE (OUTPATIENT)
Dept: FAMILY MEDICINE | Facility: CLINIC | Age: 48
End: 2023-10-10

## 2023-10-10 ENCOUNTER — HOSPITAL ENCOUNTER (OUTPATIENT)
Dept: CARDIOLOGY | Facility: CLINIC | Age: 48
Discharge: HOME | End: 2023-10-10
Attending: STUDENT IN AN ORGANIZED HEALTH CARE EDUCATION/TRAINING PROGRAM | Admitting: STUDENT IN AN ORGANIZED HEALTH CARE EDUCATION/TRAINING PROGRAM
Payer: COMMERCIAL

## 2023-10-10 DIAGNOSIS — I77.810 AORTIC ROOT DILATATION (H): Primary | ICD-10-CM

## 2023-10-10 DIAGNOSIS — R07.9 CHEST PAIN, UNSPECIFIED TYPE: ICD-10-CM

## 2023-10-10 DIAGNOSIS — I10 ESSENTIAL HYPERTENSION: ICD-10-CM

## 2023-10-10 PROCEDURE — 93018 CV STRESS TEST I&R ONLY: CPT | Performed by: INTERNAL MEDICINE

## 2023-10-10 PROCEDURE — 93016 CV STRESS TEST SUPVJ ONLY: CPT | Performed by: INTERNAL MEDICINE

## 2023-10-10 PROCEDURE — 93321 DOPPLER ECHO F-UP/LMTD STD: CPT | Mod: TC

## 2023-10-10 PROCEDURE — 93325 DOPPLER ECHO COLOR FLOW MAPG: CPT | Mod: 26 | Performed by: INTERNAL MEDICINE

## 2023-10-10 PROCEDURE — 93321 DOPPLER ECHO F-UP/LMTD STD: CPT | Mod: 26 | Performed by: INTERNAL MEDICINE

## 2023-10-10 PROCEDURE — 93350 STRESS TTE ONLY: CPT | Mod: 26 | Performed by: INTERNAL MEDICINE

## 2023-10-10 RX ORDER — METOPROLOL SUCCINATE 50 MG/1
50 TABLET, EXTENDED RELEASE ORAL DAILY
Qty: 90 TABLET | Refills: 1 | Status: SHIPPED | OUTPATIENT
Start: 2023-10-10 | End: 2023-11-09

## 2023-10-10 RX ORDER — AMLODIPINE AND BENAZEPRIL HYDROCHLORIDE 10; 40 MG/1; MG/1
1 CAPSULE ORAL DAILY
Qty: 90 CAPSULE | Refills: 1 | Status: SHIPPED | OUTPATIENT
Start: 2023-10-10 | End: 2024-04-16

## 2023-10-10 NOTE — TELEPHONE ENCOUNTER
Reviewed allergies, patient does have history of contrast allergy so I have elected to order an MRA instead, patient will get notification from their scheduling, please also have patient schedule a follow-up appoint with me after this is done to go over the results as well as results from his prior appointment.  Thank you

## 2023-10-10 NOTE — TELEPHONE ENCOUNTER
"Patient returning call to provider.  RN relayed provider message to patient :  \"40 mm aortic root, a mild enlargement. Otherwise his stress echo was normal. Provider recommend sthat we get a CTA aorta to further evaluate. \"    Patient is agreeable to CTA Aorta Scan.    Advised patient that message will be sent to provider to place referral and the Scheduling will contact patient to assist in scheduling CTA Scan.   "

## 2023-11-08 ASSESSMENT — ENCOUNTER SYMPTOMS
CONSTIPATION: 0
DIARRHEA: 0
JOINT SWELLING: 0
SHORTNESS OF BREATH: 0
FEVER: 0
HEMATURIA: 0
PALPITATIONS: 0
EYE PAIN: 0
PARESTHESIAS: 0
HEMATOCHEZIA: 0
HEADACHES: 0
CHILLS: 0
WEAKNESS: 0
ARTHRALGIAS: 0
COUGH: 0
ABDOMINAL PAIN: 0
MYALGIAS: 0
DYSURIA: 0
NAUSEA: 0
DIZZINESS: 0
NERVOUS/ANXIOUS: 0
SORE THROAT: 0
FREQUENCY: 0
HEARTBURN: 0

## 2023-11-09 ENCOUNTER — OFFICE VISIT (OUTPATIENT)
Dept: FAMILY MEDICINE | Facility: CLINIC | Age: 48
End: 2023-11-09
Payer: COMMERCIAL

## 2023-11-09 VITALS
DIASTOLIC BLOOD PRESSURE: 80 MMHG | RESPIRATION RATE: 18 BRPM | BODY MASS INDEX: 35.34 KG/M2 | HEIGHT: 64 IN | HEART RATE: 85 BPM | WEIGHT: 207 LBS | TEMPERATURE: 98.4 F | OXYGEN SATURATION: 97 % | SYSTOLIC BLOOD PRESSURE: 120 MMHG

## 2023-11-09 DIAGNOSIS — I10 ESSENTIAL HYPERTENSION: ICD-10-CM

## 2023-11-09 DIAGNOSIS — E78.5 HYPERLIPIDEMIA LDL GOAL <100: ICD-10-CM

## 2023-11-09 DIAGNOSIS — F17.210 CIGARETTE NICOTINE DEPENDENCE WITHOUT COMPLICATION: ICD-10-CM

## 2023-11-09 DIAGNOSIS — R07.9 CHEST PAIN, UNSPECIFIED TYPE: ICD-10-CM

## 2023-11-09 DIAGNOSIS — I77.810 AORTIC ROOT DILATATION (H): ICD-10-CM

## 2023-11-09 DIAGNOSIS — Z00.00 ROUTINE PHYSICAL EXAMINATION: Primary | ICD-10-CM

## 2023-11-09 DIAGNOSIS — E66.812 CLASS 2 SEVERE OBESITY WITH SERIOUS COMORBIDITY AND BODY MASS INDEX (BMI) OF 35.0 TO 35.9 IN ADULT, UNSPECIFIED OBESITY TYPE (H): ICD-10-CM

## 2023-11-09 DIAGNOSIS — E66.01 CLASS 2 SEVERE OBESITY WITH SERIOUS COMORBIDITY AND BODY MASS INDEX (BMI) OF 35.0 TO 35.9 IN ADULT, UNSPECIFIED OBESITY TYPE (H): ICD-10-CM

## 2023-11-09 DIAGNOSIS — Z23 ENCOUNTER FOR IMMUNIZATION: ICD-10-CM

## 2023-11-09 DIAGNOSIS — N18.31 CHRONIC KIDNEY DISEASE, STAGE 3A (H): ICD-10-CM

## 2023-11-09 DIAGNOSIS — L30.9 DERMATITIS: ICD-10-CM

## 2023-11-09 PROCEDURE — 90746 HEPB VACCINE 3 DOSE ADULT IM: CPT | Performed by: FAMILY MEDICINE

## 2023-11-09 PROCEDURE — 99396 PREV VISIT EST AGE 40-64: CPT | Mod: 25 | Performed by: FAMILY MEDICINE

## 2023-11-09 PROCEDURE — 90471 IMMUNIZATION ADMIN: CPT | Performed by: FAMILY MEDICINE

## 2023-11-09 PROCEDURE — 90480 ADMN SARSCOV2 VAC 1/ONLY CMP: CPT | Performed by: FAMILY MEDICINE

## 2023-11-09 PROCEDURE — 91320 SARSCV2 VAC 30MCG TRS-SUC IM: CPT | Performed by: FAMILY MEDICINE

## 2023-11-09 ASSESSMENT — PAIN SCALES - GENERAL: PAINLEVEL: NO PAIN (0)

## 2023-11-09 NOTE — PROGRESS NOTES
Assessment/Plan:     Routine physical examination  Routine healthcare maintenance.  Preventative cares reviewed.  Annual physical exams to continue.    Essential hypertension  Hypertension appears well controlled blood pressure 112/76.  Continues amlodipine benazepril 10/40 using 1 tablet daily.  No longer on metoprolol due to patient described chest pain and shortness of breath.    Hyperlipidemia LDL goal <100  Hyperlipidemia.  Diet controlled.  Attempts at weight goal less than 190 pounds initially, less than 180 pounds ideally.    Chest pain, unspecified type  Prior atypical chest pain with work-up appearing negative with stress echocardiogram without inducible ischemia.    Chronic kidney disease, stage 3a (H)  CKD stage IIIa historically and will ensure adequate hydration.  Mother with history of stage IV kidney disease on dialysis.    Dermatitis  Dermatitis bilateral shins.  Has seen dermatologist in the past.  Told that it was not psoriasis and that he would require skin biopsy for definitive diagnosis.  Patient declines follow-up.    Class 2 severe obesity with serious comorbidity and body mass index (BMI) of 35.0 to 35.9 in adult, unspecified obesity type (H)  As above, dietary and exercise modification for weight goal less than 190 pounds initially, less than 180 pounds ideally.    Cigarette nicotine dependence without complication  3 to 4 cigarettes/day described.  Recommendation for abstinence.  Patient declines further intervention.    Aortic root dilatation (H24)  40 mm aortic root enlargement described as mild noted on recent stress echocardiogram.  Patient declines further evaluation for this at this time.    Encounter for immunization  Updated COVID booster and hepatitis B #2 provided today.  Anticipate hepatitis B #3 in approximately 5 months.  - COVID-19 12+ (2023-24) (PFIZER)  - HEPATITIS B, ADULT 20+ (ENGERIX-B/RECOMBIVAX HB)                 Subjective:     Rufino Turner is a 48 year old male  who presents for an annual exam.  In general doing well.  Had episodes of chest pain.  Was seen and had further evaluation with a stress echocardiogram that was described as normal without inducible ischemia etc.  Did have 40 mm aortic root enlargement apparently.  Patient has declined further evaluation for this.  States that he had been on metoprolol which she stopped and this seemed to allow for resolution of chest pain, shortness of breath and erectile dysfunction issues.  Continues amlodipine benazepril 10/40 using 1 tablet daily.  CPAP for HARRY management.  Prior history of CKD stage IIIa.  Doing well.  Comprehensive review of systems as above otherwise all negative.     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  Smoke - 3-4 cig a day  Alcoholic beverages - very rare  No regular exercise  Surgeries - appy - 34 years old  Mother - living - CKD stage 4 (on dialysis now)  Father -  - age 50's hepatitis B, HTN  3 siblings       Healthy Habits:     Getting at least 3 servings of Calcium per day:  Yes    Bi-annual eye exam:  Yes    Dental care twice a year:  NO    Sleep apnea or symptoms of sleep apnea:  Sleep apnea    Diet:  Regular (no restrictions)    Frequency of exercise:  None    Taking medications regularly:  No    Barriers to taking medications:  Side effects    Medication side effects:  Other    Additional concerns today:  No       Immunization History   Administered Date(s) Administered    COVID-19 Bivalent 12+ (Pfizer) 10/21/2022    COVID-19 MONOVALENT 12+ (Pfizer) 2021, 2021, 11/15/2021    Flu, Unspecified 2017    Hepatitis B, Adult 2023    Influenza Vaccine >6 months (Alfuria,Fluzone) 2020, 10/30/2021, 10/21/2022, 2023    Influenza Vaccine, 6+MO IM (QUADRIVALENT W/PRESERVATIVES) 2019    Pneumococcal 20 valent Conjugate (Prevnar 20) 2023    Pneumococcal 23 valent 2018    TDAP  (Adacel,Boostrix) 2018     Immunization status: Needs COVID updated booster and hepatitis B #2.    Current Outpatient Medications   Medication Sig Dispense Refill    amLODIPine-benazepril (LOTREL) 10-40 MG capsule TAKE 1 CAPSULE BY MOUTH EVERY DAY 90 capsule 1    metoprolol succinate ER (TOPROL XL) 50 MG 24 hr tablet TAKE 1 TABLET BY MOUTH EVERY DAY (Patient not taking: Reported on 2023) 90 tablet 1     Past Medical History:   Diagnosis Date    Benign essential hypertension      No past surgical history on file.  Contrast [iodixanol] and Shrimp [shrimp extract allergy skin test]  Family History   Problem Relation Age of Onset    Kidney failure Mother      Social History     Socioeconomic History    Marital status:      Spouse name: Not on file    Number of children: Not on file    Years of education: Not on file    Highest education level: Not on file   Occupational History    Not on file   Tobacco Use    Smoking status: Every Day     Types: Cigarettes     Last attempt to quit: 2/15/2018     Years since quittin.7     Passive exposure: Current    Smokeless tobacco: Never   Vaping Use    Vaping Use: Never used   Substance and Sexual Activity    Alcohol use: Not Currently    Drug use: Never    Sexual activity: Not on file   Other Topics Concern    Not on file   Social History Narrative    Not on file     Social Determinants of Health     Financial Resource Strain: Low Risk  (2023)    Financial Resource Strain     Within the past 12 months, have you or your family members you live with been unable to get utilities (heat, electricity) when it was really needed?: No   Food Insecurity: Low Risk  (2023)    Food Insecurity     Within the past 12 months, did you worry that your food would run out before you got money to buy more?: No     Within the past 12 months, did the food you bought just not last and you didn t have money to get more?: No   Transportation Needs: Low Risk  (2023)     "Transportation Needs     Within the past 12 months, has lack of transportation kept you from medical appointments, getting your medicines, non-medical meetings or appointments, work, or from getting things that you need?: No   Physical Activity: Not on file   Stress: Not on file   Social Connections: Not on file   Interpersonal Safety: Low Risk  (11/9/2023)    Interpersonal Safety     Do you feel physically and emotionally safe where you currently live?: Yes     Within the past 12 months, have you been hit, slapped, kicked or otherwise physically hurt by someone?: No     Within the past 12 months, have you been humiliated or emotionally abused in other ways by your partner or ex-partner?: No   Housing Stability: Low Risk  (9/20/2023)    Housing Stability     Do you have housing? : Yes     Are you worried about losing your housing?: No       Review of Systems  Comprehensive ROS: as above, otherwise all negative.           Objective:     /80   Pulse 85   Temp 98.4  F (36.9  C)   Resp 18   Ht 1.632 m (5' 4.25\")   Wt 93.9 kg (207 lb)   SpO2 97%   BMI 35.26 kg/m    Body mass index is 35.26 kg/m .    Physical    General Appearance:    Alert, cooperative, no distress, appears stated age.     Head:    Normocephalic, without obvious abnormality, atraumatic   Eyes:    PERRL, conjunctiva/corneas clear, EOM's intact, fundi     benign, both eyes        Ears:    Normal TM's and external ear canals, both ears   Nose:   Nares normal, septum midline, mucosa normal, no drainage    or sinus tenderness   Throat:   Lips, mucosa, and tongue normal; teeth and gums normal   Neck:   Supple, symmetrical, trachea midline, no adenopathy;        thyroid:  No enlargement/tenderness/nodules; no carotid    bruit or JVD   Back:     Symmetric, no curvature, ROM normal, no CVA tenderness   Lungs:     Clear to auscultation bilaterally, respirations unlabored   Chest wall:    No tenderness or deformity   Heart:    Regular rate and rhythm, " S1 and S2 normal, no murmur, rub   or gallop   Abdomen:     Soft, non-tender, bowel sounds active all four quadrants,     no masses, no organomegaly.     Genitalia:    Normal male without lesion, discharge or tenderness.  No inguinal hernia noted.     Rectal:    Normal tone.  Prostate normal/symmetric, no masses or tenderness.   Extremities:   Extremities normal, atraumatic, no cyanosis or edema   Pulses:   2+ and symmetric all extremities   Skin:   Skin color, texture, turgor normal, no rashes or lesions   Lymph nodes:   Cervical, supraclavicular, and axillary nodes normal   Neurologic:   CNII-XII intact. Normal strength, sensation and reflexes       throughout          Stress Echo 10/10/23 Complete.  ______________________________________________________________________________  Interpretation Summary  1. Normal stress echocardiogram without evidence of stress induced ischemia at  heart rate of 148 bpm (79% peak heart rate). Reduced sensitive of test in  detection of ischemia given patient only obtained 79% of maximal predicted  heart rate.  2. Normal resting LV systolic performance with an ejection fraction of 55-60%.  There is normal improvement in left ventricular systolic performance with a  peak ejection fraction of 70-75%.  3. No ECG evidence of ischemia.  4. No anginal chest pain reported with exercise.  5. Normal functional capacity for age. 11 METS  6. Mild concentric left ventricular hypertrophy.  7. Mild aortic root enlargement at 40 mm.        This note has been dictated using voice recognition software and as a result may contain minor grammatical errors and unintended word substitutions.         Answers submitted by the patient for this visit:  Annual Preventive Visit (Submitted on 11/8/2023)  Chief Complaint: Annual Exam:  Frequency of exercise:: None  Getting at least 3 servings of Calcium per day:: Yes  Diet:: Regular (no restrictions)  Taking medications regularly:: No  Medication side effects::  Other  Bi-annual eye exam:: Yes  Dental care twice a year:: NO  Sleep apnea or symptoms of sleep apnea:: Sleep apnea  abdominal pain: No  Blood in stool: No  Blood in urine: No  chest pain: Yes  chills: No  congestion: No  constipation: No  cough: No  diarrhea: No  dizziness: No  ear pain: No  eye pain: No  nervous/anxious: No  fever: No  frequency: No  genital sores: No  headaches: No  hearing loss: No  heartburn: No  arthralgias: No  joint swelling: No  peripheral edema: No  mood changes: No  myalgias: No  nausea: No  dysuria: No  palpitations: No  Skin sensation changes: No  sore throat: No  urgency: No  rash: No  shortness of breath: No  visual disturbance: No  weakness: No  impotence: Yes  penile discharge: No  Additional concerns today:: No  Barriers to taking medications (Submitted on 11/8/2023)  Chief Complaint: Annual Exam:  Barriers to taking medications:: Side effects

## 2024-04-16 DIAGNOSIS — I10 ESSENTIAL HYPERTENSION: ICD-10-CM

## 2024-04-16 RX ORDER — AMLODIPINE AND BENAZEPRIL HYDROCHLORIDE 10; 40 MG/1; MG/1
1 CAPSULE ORAL DAILY
Qty: 90 CAPSULE | Refills: 1 | Status: SHIPPED | OUTPATIENT
Start: 2024-04-16

## 2024-10-29 ENCOUNTER — MYC REFILL (OUTPATIENT)
Dept: FAMILY MEDICINE | Facility: CLINIC | Age: 49
End: 2024-10-29
Payer: COMMERCIAL

## 2024-10-29 DIAGNOSIS — I10 ESSENTIAL HYPERTENSION: ICD-10-CM

## 2024-10-29 RX ORDER — AMLODIPINE AND BENAZEPRIL HYDROCHLORIDE 10; 40 MG/1; MG/1
1 CAPSULE ORAL DAILY
Qty: 90 CAPSULE | Refills: 1 | Status: SHIPPED | OUTPATIENT
Start: 2024-10-29

## 2024-12-29 ENCOUNTER — HEALTH MAINTENANCE LETTER (OUTPATIENT)
Age: 49
End: 2024-12-29

## 2025-04-30 ENCOUNTER — OFFICE VISIT (OUTPATIENT)
Dept: URGENT CARE | Facility: URGENT CARE | Age: 50
End: 2025-04-30
Payer: COMMERCIAL

## 2025-04-30 ENCOUNTER — NURSE TRIAGE (OUTPATIENT)
Dept: FAMILY MEDICINE | Facility: CLINIC | Age: 50
End: 2025-04-30
Payer: COMMERCIAL

## 2025-04-30 VITALS
WEIGHT: 200 LBS | DIASTOLIC BLOOD PRESSURE: 84 MMHG | RESPIRATION RATE: 18 BRPM | BODY MASS INDEX: 34.06 KG/M2 | TEMPERATURE: 97.8 F | OXYGEN SATURATION: 97 % | HEART RATE: 69 BPM | SYSTOLIC BLOOD PRESSURE: 121 MMHG

## 2025-04-30 DIAGNOSIS — R04.0 EPISTAXIS: Primary | ICD-10-CM

## 2025-04-30 PROCEDURE — 99212 OFFICE O/P EST SF 10 MIN: CPT | Performed by: PHYSICIAN ASSISTANT

## 2025-04-30 NOTE — PROGRESS NOTES
Urgent Care Clinic Visit    Chief Complaint   Patient presents with    Nose Bleeds     Nose bleeding since last night.

## 2025-04-30 NOTE — PROGRESS NOTES
Assessment & Plan:      Problem List Items Addressed This Visit    None  Visit Diagnoses       Epistaxis    -  Primary          Medical Decision Making  Patient presents for nosebleeds on and off over the last 24 hours.  Symptoms are consistent with an anterior septal epistaxis.  Bleeding well-controlled here at the clinic.  Discussed treatment and symptomatic care.  Allergies and medication interactions reviewed.  Discussed signs of worsening symptoms and when to follow-up with PCP if no symptom improvement.     Subjective:      Rufino Turner is a 49 year old male here for evaluation of nosebleeds.  Onset of symptoms started yesterday after patient was napping.  Nosebleed has been on and off since then.  Nosebleed was constant over the last 3 to 4 hours before presenting to the clinic.  On the way here, patient staffed to large tissues up his nose and held pressure the way here.  By the time the patient arrived at the clinic, the nosebleed stopped.     The following portions of the patient's history were reviewed and updated as appropriate: allergies, current medications, and problem list.     Review of Systems  Pertinent items are noted in HPI.    Allergies  Allergies   Allergen Reactions    Contrast [Iodixanol] Unknown    Shrimp [Shrimp Extract] Unknown       Family History   Problem Relation Age of Onset    Kidney failure Mother        Social History     Tobacco Use    Smoking status: Every Day     Current packs/day: 0.00     Types: Cigarettes     Last attempt to quit: 2/15/2018     Years since quittin.2     Passive exposure: Current    Smokeless tobacco: Never   Substance Use Topics    Alcohol use: Not Currently        Objective:      /84   Pulse 69   Temp 97.8  F (36.6  C)   Resp 18   Wt 90.7 kg (200 lb)   SpO2 97%   BMI 34.06 kg/m    General appearance - alert, well appearing, and in no distress and non-toxic  Nose - small tears seen along the inner left nostril at the anterior septum, no  active bleeding    The use of Dragon/[employer] dictation services was used to construct the content of this note; any grammatical errors are non-intentional. Please contact the author directly if you are in need of any clarification.

## 2025-05-02 ENCOUNTER — HOSPITAL ENCOUNTER (EMERGENCY)
Facility: CLINIC | Age: 50
Discharge: HOME | End: 2025-05-02
Payer: COMMERCIAL

## 2025-05-02 VITALS
BODY MASS INDEX: 36.29 KG/M2 | RESPIRATION RATE: 17 BRPM | HEART RATE: 89 BPM | HEIGHT: 63 IN | SYSTOLIC BLOOD PRESSURE: 152 MMHG | DIASTOLIC BLOOD PRESSURE: 97 MMHG | WEIGHT: 204.8 LBS | TEMPERATURE: 98.5 F | OXYGEN SATURATION: 95 %

## 2025-05-02 DIAGNOSIS — R04.0 EPISTAXIS: ICD-10-CM

## 2025-05-02 PROCEDURE — 250N000009 HC RX 250

## 2025-05-02 PROCEDURE — 99283 EMERGENCY DEPT VISIT LOW MDM: CPT

## 2025-05-02 PROCEDURE — 30901 CONTROL OF NOSEBLEED: CPT | Mod: LT

## 2025-05-02 RX ORDER — TRANEXAMIC ACID 100 MG/ML
500 INJECTION, SOLUTION INTRAVENOUS ONCE
Status: COMPLETED | OUTPATIENT
Start: 2025-05-02 | End: 2025-05-02

## 2025-05-02 RX ADMIN — TRANEXAMIC ACID 500 MG: 1 INJECTION, SOLUTION INTRAVENOUS at 22:05

## 2025-05-02 ASSESSMENT — COLUMBIA-SUICIDE SEVERITY RATING SCALE - C-SSRS
6. HAVE YOU EVER DONE ANYTHING, STARTED TO DO ANYTHING, OR PREPARED TO DO ANYTHING TO END YOUR LIFE?: NO
2. HAVE YOU ACTUALLY HAD ANY THOUGHTS OF KILLING YOURSELF IN THE PAST MONTH?: NO
1. IN THE PAST MONTH, HAVE YOU WISHED YOU WERE DEAD OR WISHED YOU COULD GO TO SLEEP AND NOT WAKE UP?: NO

## 2025-05-02 ASSESSMENT — ACTIVITIES OF DAILY LIVING (ADL)
ADLS_ACUITY_SCORE: 41
ADLS_ACUITY_SCORE: 41

## 2025-05-03 ENCOUNTER — HOSPITAL ENCOUNTER (EMERGENCY)
Facility: CLINIC | Age: 50
Discharge: HOME | End: 2025-05-03
Attending: EMERGENCY MEDICINE | Admitting: EMERGENCY MEDICINE
Payer: COMMERCIAL

## 2025-05-03 VITALS
RESPIRATION RATE: 18 BRPM | OXYGEN SATURATION: 96 % | WEIGHT: 204 LBS | HEART RATE: 82 BPM | HEIGHT: 63 IN | SYSTOLIC BLOOD PRESSURE: 154 MMHG | BODY MASS INDEX: 36.14 KG/M2 | DIASTOLIC BLOOD PRESSURE: 106 MMHG | TEMPERATURE: 97.1 F

## 2025-05-03 DIAGNOSIS — R04.0 EPISTAXIS: ICD-10-CM

## 2025-05-03 PROCEDURE — 99284 EMERGENCY DEPT VISIT MOD MDM: CPT

## 2025-05-03 PROCEDURE — 250N000013 HC RX MED GY IP 250 OP 250 PS 637: Performed by: EMERGENCY MEDICINE

## 2025-05-03 PROCEDURE — 30903 CONTROL OF NOSEBLEED: CPT | Mod: LT

## 2025-05-03 PROCEDURE — 250N000009 HC RX 250: Performed by: EMERGENCY MEDICINE

## 2025-05-03 RX ORDER — TRANEXAMIC ACID 100 MG/ML
500 INJECTION, SOLUTION INTRAVENOUS ONCE
Status: COMPLETED | OUTPATIENT
Start: 2025-05-03 | End: 2025-05-03

## 2025-05-03 RX ORDER — IBUPROFEN 600 MG/1
600 TABLET, FILM COATED ORAL ONCE
Status: COMPLETED | OUTPATIENT
Start: 2025-05-03 | End: 2025-05-03

## 2025-05-03 RX ORDER — OXYCODONE HYDROCHLORIDE 5 MG/1
5 TABLET ORAL ONCE
Status: COMPLETED | OUTPATIENT
Start: 2025-05-03 | End: 2025-05-03

## 2025-05-03 RX ADMIN — TRANEXAMIC ACID 500 MG: 1 INJECTION, SOLUTION INTRAVENOUS at 01:51

## 2025-05-03 RX ADMIN — IBUPROFEN 600 MG: 600 TABLET ORAL at 01:50

## 2025-05-03 RX ADMIN — OXYCODONE 5 MG: 5 TABLET ORAL at 01:58

## 2025-05-03 ASSESSMENT — COLUMBIA-SUICIDE SEVERITY RATING SCALE - C-SSRS
1. IN THE PAST MONTH, HAVE YOU WISHED YOU WERE DEAD OR WISHED YOU COULD GO TO SLEEP AND NOT WAKE UP?: NO
6. HAVE YOU EVER DONE ANYTHING, STARTED TO DO ANYTHING, OR PREPARED TO DO ANYTHING TO END YOUR LIFE?: NO
2. HAVE YOU ACTUALLY HAD ANY THOUGHTS OF KILLING YOURSELF IN THE PAST MONTH?: NO

## 2025-05-03 ASSESSMENT — ACTIVITIES OF DAILY LIVING (ADL)
ADLS_ACUITY_SCORE: 41
ADLS_ACUITY_SCORE: 41

## 2025-05-03 NOTE — ED NOTES
Complains of left sided headache proximal to left eye.  Pt continues to express being very uncomfortable. Pt is restless. Provider made aware of discomfort and bp being elevated. Continuing to monitor.

## 2025-05-03 NOTE — DISCHARGE INSTRUCTIONS
Leave the nasal packing in place until you are seen by ENT.  Return to the emergency department for any concerns.

## 2025-05-03 NOTE — ED PROVIDER NOTES
Emergency Department Encounter   NAME: Rufino Turner ; AGE: 49 year old male ; YOB: 1975 ; MRN: [id number] ; PCP: Solomon Wilson   ED PROVIDER: Vel Kelley PA-C    Evaluation Date & Time:   No admission date for patient encounter.    CHIEF COMPLAINT:  Epistaxis      Impression and Plan   MDM: Rufino Turner is a 49 year old male who presents to the ED for evaluation of epistaxis. Patient had a sudden onset of epistaxis 3 nights ago from his left nostril and since has been having epistaxis daily which lasts 15 minutes then stops for about 1-2 hours. He went to urgent care today and had stopped the bleeding with a clamp, but 1 hour later bleeding started again.  Denies any nasal trauma or anticoagulant use.    Vitals reviewed and are stable. On exam patient initially not having any active bleeding from either nostril.  However, upon reevaluation, patient having significant bleeding from his left nostril.  No specific source of the bleed cannot be seen on examination.  Patient regularly spitting up blood.    Initially, bleeding is controlled in the ED and no obvious source of active bleed can be visualized, so TXA soaked cotton balls were inserted into the left nostril after patient blew his nose. After 20 minutes, patient was still having persistent bleeding out of his right nostril. Merocel used to pack the left nostril, and this stopped the bleeding. I discussed with the patient that he should leave the packing in and avoid getting it wet. I placed an ENT referral and stated the patient should follow up with them for removal. However, if he is unable to get in to ENT within 48 hours, I informed him that he should be seen in urgent care or back in the ER for removal of the packing.  Patient is comfortable and okay for discharge.      ED COURSE:  8:15 PM I met and introduced myself to the patient. I gathered initial history and performed my physical exam. We discussed plan for initial workup.     9:59  "PM I rechecked the patient and discussed results, discharge, follow up, and reasons to return to the ED.     At the conclusion of the encounter I discussed the results of all the tests and the disposition. The questions were answered. The patient or family acknowledged understanding and was agreeable with the care plan.    Medical Decision Making  Discharge. No recommendations on prescription strength medication(s). See documentation for any additional details.    MIPS (CTPE, Dental pain, Steve, Sinusitis, Asthma/COPD, Head Trauma): Not Applicable    SEPSIS: None        FINAL IMPRESSION:    ICD-10-CM    1. Epistaxis  R04.0 Adult ENT  Referral            MEDICATIONS GIVEN IN THE EMERGENCY DEPARTMENT:  Medications   tranexamic acid (CYKLOKAPRON) spray 500 mg (has no administration in time range)         NEW PRESCRIPTIONS STARTED AT TODAY'S ED VISIT:  New Prescriptions    No medications on file         HPI   Use of Intrepreter: N/A    Rufino Turner is a 49 year old male with a pertinent history of hypertension who presents to the ED for evaluation of epistaxis.     Patient had a sudden onset of epistaxis 3 nights ago from his left nostril and since has been having epistaxis daily which lasts 15 minutes then stops for about 1-2 hours. He went to urgent care today and had stopped the bleeding with a clamp, but 1 hour later bleeding started again. Provider at urgent care informed patient there is a cut in his left nostril. Patient reports he can \"feel it coming\" and is concerned if the bleeding is coming from a deeper source. He was unable to work yesterday and reports the blood is \"gushing out\" 4-5 times per day. This has never happened before and he had no trauma/injury to the nose or face. Reports a mild headache and some lightheadedness. Denies a cold or anticoagulation.    Of note, patient spits blood into an emesis basin mid evaluation.    REVIEW OF SYSTEMS:  Pertinent positive and negative symptoms per HPI. " "      Medical History     Past Medical History:   Diagnosis Date    Benign essential hypertension        History reviewed. No pertinent surgical history.    Family History   Problem Relation Age of Onset    Kidney failure Mother        Social History     Tobacco Use    Smoking status: Every Day     Current packs/day: 0.00     Types: Cigarettes     Last attempt to quit: 2/15/2018     Years since quittin.2     Passive exposure: Current    Smokeless tobacco: Never   Vaping Use    Vaping status: Never Used   Substance Use Topics    Alcohol use: Not Currently    Drug use: Never       amLODIPine-benazepril (LOTREL) 10-40 MG capsule          Physical Exam     First Vitals:  Patient Vitals for the past 24 hrs:   BP Temp Temp src Pulse Resp SpO2 Height Weight   25 -- 98.5  F (36.9  C) Oral -- -- -- -- --   25 (!) 152/97 97.2  F (36.2  C) Oral 89 17 95 % 1.6 m (5' 3\") 92.9 kg (204 lb 12.8 oz)         PHYSICAL EXAM:   Physical Exam  Constitutional:       General: He is not in acute distress.     Appearance: Normal appearance. He is not toxic-appearing.   HENT:      Head: Atraumatic.      Nose:      Comments: No external nose trauma  Initially no active bleeding from either nostril, but later bleeding started from left nostril.  Patient regularly spitting up blood as well  Eyes:      General: No scleral icterus.     Conjunctiva/sclera: Conjunctivae normal.   Cardiovascular:      Rate and Rhythm: Normal rate.      Heart sounds: Normal heart sounds.   Pulmonary:      Effort: Pulmonary effort is normal. No respiratory distress.      Breath sounds: Normal breath sounds.   Abdominal:      Palpations: Abdomen is soft.      Tenderness: There is no abdominal tenderness.   Musculoskeletal:         General: No deformity.      Cervical back: Neck supple.   Skin:     General: Skin is warm.   Neurological:      General: No focal deficit present.      Mental Status: He is alert.             Results     LAB:  All " pertinent labs reviewed and interpreted  Labs Ordered and Resulted from Time of ED Arrival to Time of ED Departure - No data to display    RADIOLOGY:  No orders to display         ECG:  None    PROCEDURES:    PROCEDURE: Epistaxis Management   INDICATIONS: Failure of epistaxis control with non-invasive management techniques.   PROCEDURE PROVIDER: Vel Kelley PA-C   SITE: Left, Anterior   MEDICATION: None    NOTE: Anterior Source:  The area was evaluated and cleared with nasal suction to locate source of bleeding. The bleeding location was managed with  Merocel and TXA cotton balls .  Following treatment the patient was observed and no significant bleeding was noted to recur.     COMPLICATIONS: Patient tolerated procedure well, without complication          IRene, am serving as a scribe to document services personally performed by Vel Kelley PA-C, based on my observation and the provider's statements to me. I, Vel Kelley PA-C attest that Rene Parada is acting in a scribe capacity, has observed my performance of the services and has documented them in accordance with my direction.       Vel Kelley PA-C   Emergency Medicine   [facility] EMERGENCY ROOM      Vel Kelley PA-C  05/02/25 1040

## 2025-05-03 NOTE — ED TRIAGE NOTES
Pt arrives to ed with c/o of nose bleed that has been on and off since Tuesday. Nose clamp placed in triage.      Triage Assessment (Adult)       Row Name 05/02/25 2018          Triage Assessment    Airway WDL WDL        Respiratory WDL    Respiratory WDL WDL        Cardiac WDL    Cardiac WDL WDL        Cognitive/Neuro/Behavioral WDL    Cognitive/Neuro/Behavioral WDL WDL

## 2025-05-03 NOTE — ED TRIAGE NOTES
Pt arrives to ed with nose bleed on right side, was here a few hours ago for same reason but was a nose bleed on the other side. Pt denies nausea, lightheadedness.      Triage Assessment (Adult)       Row Name 05/03/25 0033          Triage Assessment    Airway WDL WDL        Respiratory WDL    Respiratory WDL WDL        Cardiac WDL    Cardiac WDL WDL        Cognitive/Neuro/Behavioral WDL    Cognitive/Neuro/Behavioral WDL WDL

## 2025-05-03 NOTE — ED NOTES
Pt states pain is in left eye, left temporal and left superior jaw/ cheek   details… detailed exam no joint swelling/no joint erythema/no joint warmth/ROM intact/no calf tenderness/normal strength

## 2025-05-03 NOTE — DISCHARGE INSTRUCTIONS
Your emergency department evaluation today is reassuring.  I have placed an ENT referral for further evaluation of your nosebleeds. They will call you to schedule an appointment. Please follow-up with them in 2 days for removal of your nasal packing or go to urgent care if you are unable to schedule an appointment within that time. You can also return to the ED for this. Please leave the packing in place and avoid getting it wet.

## 2025-05-03 NOTE — ED PROVIDER NOTES
EMERGENCY DEPARTMENT ENCOUNTER      NAME: Rufino Turner  AGE: 49 year old male  YOB: 1975  MRN: [id number]  EVALUATION DATE & TIME: 5/3/2025 12:43 AM    PCP: Solomon Wilson    ED PROVIDER: Becki Bazan M.D.      Chief Complaint   Patient presents with    Epistaxis         FINAL IMPRESSION:  1. Epistaxis        MEDICAL DECISION MAKING:    Pertinent Labs & Imaging studies reviewed. (See chart for details)  ED Course as of 05/03/25 0307   Sat May 03, 2025   0057 Afebrile.  Vital signs here   0100  unremarkable.  Patient is coming in with nosebleed.  Started Tuesday.  Went to urgent care 4/30/25, nosebleeding had stopped at that time, came back again today.  And had the left nostril packed with Merocel.  Patient has ENT follow-up.  Coming back in tonight because he was having some bleeding in the back of his throat as well as bleeding out of his right nare and some increased bleeding out of the left nare.  No blood down the posterior oropharynx, mild bleeding from the right.  Discussed with patient that can have bleeding that is further back but is rotating through and coming out of the right as he has not had any bleeding from his right nare.    Physical exam for patient here with bleeding from left nare after the Merisel packing was removed.  No bleeding from the right.  No obvious source of bleeding identified.    Plan to packed with posterior Rhino Rocket.  Will soak the Rhino Rocket with TXA.   0110 Posterior packed with Rhino Rocket left nare.  Still having some dripping from the anterior left nare.  Has approximately 7 cc of air in.  Having him hold pressure anteriorly and will monitor and reevaluate.   0233 Patient continues with no bleeding.  Blood pressure is coming down after some pain medication and I did remove some air from the nasal packing.  Will discharge home, follow-up with ENT.   0236 Was able to remove 2 more cc of air, total of 4 cc of air now currently in the balloon.  Patient not  having any current bleeding.  Feels much more comfortable.  Plan to discharge patient to home, follow-up with the ENT.         Medical Decision Making      Discharge. No recommendations on prescription strength medication(s). See documentation for any additional details.    MIPS (CTPE, Dental pain, Steve, Sinusitis, Asthma/COPD, Head Trauma): Not Applicable    SEPSIS: None          Critical care: 0 minutes excluding separately billable procedures.  Includes bedside management, time reviewing test results, review of records, discussing the case with staff, documenting the medical record and time spent with family members (or surrogate decision makers) discussing specific treatment issues.          ED COURSE:  2:12 AM Reevaluated and updated patient. Removed 3 cc of air and patient feels improved.   2:34 AM Removed additional air. Patient feels improved and is comfortable being discharged home.     The importance of close follow up was discussed. We reviewed warning signs and symptoms, and I instructed Mr. Turner to return to the emergency department immediately if he develops any new or worsening symptoms. I provided additional verbal discharge instructions. Mr. Turner expressed understanding and agreement with this plan of care, his questions were answered, and he was discharged in stable condition.     MEDICATIONS GIVEN IN THE EMERGENCY:  Medications   tranexamic acid (CYKLOKAPRON) spray 500 mg (500 mg Both Nostrils $Given by Other 5/3/25 0151)   ibuprofen (ADVIL/MOTRIN) tablet 600 mg (600 mg Oral $Given 5/3/25 0150)   oxyCODONE (ROXICODONE) tablet 5 mg (5 mg Oral $Given 5/3/25 0158)       NEW PRESCRIPTIONS STARTED AT TODAY'S ER VISIT:  Discharge Medication List as of 5/3/2025  2:38 AM             =================================================================    HPI    Patient information was obtained from: patient    Use of : N/A         Rufino Turner is a 49 year old male who presents with a nosebleed.  Nosebleed began on Tuesday (4 days ago). He visited urgent care on 25 for this, nosebleeding had stopped at that time, came back again today.  Had the left nostril packed with Merocel at the time.  Patient has ENT follow-up.  Coming back in tonight because he was having some bleeding in the back of his throat as well as bleeding out of his right nare and some increased bleeding out of the left nare. No other complaints or concerns at this time.      REVIEW OF SYSTEMS   Refer to HPI. All other systems negative.      PAST MEDICAL HISTORY:  Past Medical History:   Diagnosis Date    Benign essential hypertension        PAST SURGICAL HISTORY:  No past surgical history on file.    CURRENT MEDICATIONS:    No current facility-administered medications for this encounter.    Current Outpatient Medications:     amLODIPine-benazepril (LOTREL) 10-40 MG capsule, Take 1 capsule by mouth daily., Disp: 90 capsule, Rfl: 1    ALLERGIES:  Allergies   Allergen Reactions    Contrast [Iodixanol] Unknown    Shrimp [Shrimp Extract] Unknown       FAMILY HISTORY:  Family History   Problem Relation Age of Onset    Kidney failure Mother        SOCIAL HISTORY:   Social History     Socioeconomic History    Marital status:    Tobacco Use    Smoking status: Every Day     Current packs/day: 0.00     Types: Cigarettes     Last attempt to quit: 2/15/2018     Years since quittin.2     Passive exposure: Current    Smokeless tobacco: Never   Vaping Use    Vaping status: Never Used   Substance and Sexual Activity    Alcohol use: Not Currently    Drug use: Never     Social Drivers of Health     Financial Resource Strain: Low Risk  (2023)    Financial Resource Strain     Within the past 12 months, have you or your family members you live with been unable to get utilities (heat, electricity) when it was really needed?: No   Food Insecurity: Low Risk  (2023)    Food Insecurity     Within the past 12 months, did you worry that your  "food would run out before you got money to buy more?: No     Within the past 12 months, did the food you bought just not last and you didn t have money to get more?: No   Transportation Needs: Low Risk  (9/20/2023)    Transportation Needs     Within the past 12 months, has lack of transportation kept you from medical appointments, getting your medicines, non-medical meetings or appointments, work, or from getting things that you need?: No   Interpersonal Safety: Not At Risk (7/27/2024)    Received from [facility]     Humiliation, Afraid, Rape, and Kick questionnaire     Fear of Current or Ex-Partner: No     Emotionally Abused: No     Physically Abused: No     Sexually Abused: No   Housing Stability: Low Risk  (9/20/2023)    Housing Stability     Do you have housing? : Yes     Are you worried about losing your housing?: No       PHYSICAL EXAM:    Vitals: BP (!) 154/106   Pulse 82   Temp 97.1  F (36.2  C) (Oral)   Resp 18   Ht 1.6 m (5' 3\")   Wt 92.5 kg (204 lb)   SpO2 96%   BMI 36.14 kg/m     General:. Alert and interactive, comfortable appearing.  HENT: Oropharynx without erythema or exudates. MMM.  TMs clear bilaterally.  Bleeding from left nare after the Merocel packing was removed.  No bleeding from the right.  No obvious source of bleeding identified.  Eyes: Pupils mid-sized and equally reactive.   Neck: Full AROM.  No midline tenderness to palpation.  Cardiovascular: Regular rate and rhythm. Peripheral pulses 2+ bilaterally.  Chest/Pulmonary: Normal work of breathing.  Extremities: Normal ROM of all major joints.  Skin: Warm and dry.   Psych: Normal affect/mood, cooperative, memory appropriate.     LAB:  All pertinent labs reviewed and interpreted.  Labs Ordered and Resulted from Time of ED Arrival to Time of ED Departure - No data to display    RADIOLOGY:  No orders to display           PROCEDURES:     PROCEDURE: Epistaxis Management   INDICATIONS: Failure of epistaxis control with " non-invasive management techniques.   PROCEDURE PROVIDER: Dr Yadi Bazan   SITE: Left, Posterior   MEDICATION: None    NOTE: Posterior Source: The area was evaluated and cleared with nasal suction.  The bleeding location was managed with posterior Rhino Rocket (Extended size rapid rhino packing OR dual balloon pack).  Following treatment the patient was observed and no significant bleeding was noted to recur.     COMPLICATIONS: Patient tolerated procedure well, without complication           I, Nallely Weldon, am serving as a scribe to document services personally performed by Dr. Becki Bazan  based on my observation and the provider's statements to me. I, Becki Bazan MD attest that Nallely Weldon is acting in a scribe capacity, has observed my performance of the services and has documented them in accordance with my direction.      Becki Bazan M.D.  Emergency Medicine  [facility] EMERGENCY ROOM  [address] [facility] [address]  [phone]  Dept: [phone]     Becki Bazan MD  05/03/25 0306

## 2025-05-05 ENCOUNTER — PATIENT OUTREACH (OUTPATIENT)
Dept: CARE COORDINATION | Facility: CLINIC | Age: 50
End: 2025-05-05

## 2025-05-05 ENCOUNTER — LAB (OUTPATIENT)
Dept: LAB | Facility: CLINIC | Age: 50
End: 2025-05-05
Payer: COMMERCIAL

## 2025-05-05 ENCOUNTER — OFFICE VISIT (OUTPATIENT)
Dept: OTOLARYNGOLOGY | Facility: CLINIC | Age: 50
End: 2025-05-05
Payer: COMMERCIAL

## 2025-05-05 ENCOUNTER — NURSE TRIAGE (OUTPATIENT)
Dept: NURSING | Facility: CLINIC | Age: 50
End: 2025-05-05

## 2025-05-05 VITALS — DIASTOLIC BLOOD PRESSURE: 98 MMHG | SYSTOLIC BLOOD PRESSURE: 142 MMHG

## 2025-05-05 DIAGNOSIS — R04.0 EPISTAXIS: ICD-10-CM

## 2025-05-05 DIAGNOSIS — R04.0 EPISTAXIS: Primary | ICD-10-CM

## 2025-05-05 LAB
APTT PPP: 30 SECONDS (ref 22–38)
BASOPHILS # BLD AUTO: 0 10E3/UL (ref 0–0.2)
BASOPHILS NFR BLD AUTO: 0 %
EOSINOPHIL # BLD AUTO: 0.3 10E3/UL (ref 0–0.7)
EOSINOPHIL NFR BLD AUTO: 3 %
ERYTHROCYTE [DISTWIDTH] IN BLOOD BY AUTOMATED COUNT: 12.9 % (ref 10–15)
HCT VFR BLD AUTO: 32.8 % (ref 40–53)
HGB BLD-MCNC: 10.9 G/DL (ref 13.3–17.7)
IMM GRANULOCYTES # BLD: 0 10E3/UL
IMM GRANULOCYTES NFR BLD: 0 %
INR PPP: 1.04 (ref 0.85–1.15)
LYMPHOCYTES # BLD AUTO: 2.4 10E3/UL (ref 0.8–5.3)
LYMPHOCYTES NFR BLD AUTO: 24 %
MCH RBC QN AUTO: 29.4 PG (ref 26.5–33)
MCHC RBC AUTO-ENTMCNC: 33.2 G/DL (ref 31.5–36.5)
MCV RBC AUTO: 88 FL (ref 78–100)
MONOCYTES # BLD AUTO: 0.6 10E3/UL (ref 0–1.3)
MONOCYTES NFR BLD AUTO: 6 %
NEUTROPHILS # BLD AUTO: 6.6 10E3/UL (ref 1.6–8.3)
NEUTROPHILS NFR BLD AUTO: 67 %
PLATELET # BLD AUTO: 231 10E3/UL (ref 150–450)
PROTHROMBIN TIME: 13.6 SECONDS (ref 11.8–14.8)
RBC # BLD AUTO: 3.71 10E6/UL (ref 4.4–5.9)
WBC # BLD AUTO: 9.9 10E3/UL (ref 4–11)

## 2025-05-05 PROCEDURE — 36415 COLL VENOUS BLD VENIPUNCTURE: CPT

## 2025-05-05 PROCEDURE — 85610 PROTHROMBIN TIME: CPT

## 2025-05-05 PROCEDURE — 99203 OFFICE O/P NEW LOW 30 MIN: CPT | Performed by: OTOLARYNGOLOGY

## 2025-05-05 PROCEDURE — 85025 COMPLETE CBC W/AUTO DIFF WBC: CPT

## 2025-05-05 PROCEDURE — 85730 THROMBOPLASTIN TIME PARTIAL: CPT

## 2025-05-05 NOTE — PROGRESS NOTES
CHIEF COMPLAINT: Patient presents with:  Epistaxis: Per PT last bleed was one hr ago but it is stable now.          HISTORY OF PRESENT ILLNESS    Vinod was seen at the behest of  Solomon Wilson MD for epistaxis.       ED VISIT 5/2/2025    Epistaxis     Impression and Plan   MDM: Rufino Turner is a 49 year old male who presents to the ED for evaluation of epistaxis. Patient had a sudden onset of epistaxis 3 nights ago from his left nostril and since has been having epistaxis daily which lasts 15 minutes then stops for about 1-2 hours. He went to urgent care today and had stopped the bleeding with a clamp, but 1 hour later bleeding started again.  Denies any nasal trauma or anticoagulant use.     Vitals reviewed and are stable. On exam patient initially not having any active bleeding from either nostril.  However, upon reevaluation, patient having significant bleeding from his left nostril.  No specific source of the bleed cannot be seen on examination.  Patient regularly spitting up blood.     Initially, bleeding is controlled in the ED and no obvious source of active bleed can be visualized, so TXA soaked cotton balls were inserted into the left nostril after patient blew his nose. After 20 minutes, patient was still having persistent bleeding out of his right nostril. Merocel used to pack the left nostril, and this stopped the bleeding. I discussed with the patient that he should leave the packing in and avoid getting it wet. I placed an ENT referral and stated the patient should follow up with them for removal. However, if he is unable to get in to ENT within 48 hours, I informed him that he should be seen in urgent care or back in the ER for removal of the packing.  Patient is comfortable and okay for discharge.       REVIEW OF SYSTEMS    Review of Systems as per HPI and PMHx, otherwise 10 system review system are negative.       ALLERGIES    Contrast [iodixanol] and Shrimp [shrimp extract]    CURRENT  MEDICATIONS      Current Outpatient Medications:     amLODIPine-benazepril (LOTREL) 10-40 MG capsule, Take 1 capsule by mouth daily., Disp: 90 capsule, Rfl: 1     PAST MEDICAL HISTORY    PAST MEDICAL HISTORY:   Past Medical History:   Diagnosis Date    Benign essential hypertension        PAST SURGICAL HISTORY    PAST SURGICAL HISTORY: No past surgical history on file.    FAMILY  HISTORY    FAMILY HISTORY:   Family History   Problem Relation Age of Onset    Kidney failure Mother        SOCIAL HISTORY    SOCIAL HISTORY:   Social History     Tobacco Use    Smoking status: Every Day     Current packs/day: 0.00     Types: Cigarettes     Last attempt to quit: 2/15/2018     Years since quittin.2     Passive exposure: Current    Smokeless tobacco: Never   Substance Use Topics    Alcohol use: Not Currently        PHYSICAL EXAM    HEAD: Normal appearance and symmetry:  No cutaneous lesions.      NECK:  supple     EARS:    Right:   TM intact   LEFT:  TM Intact    EYES:  EOMI    CN VII/XII:  intact     NOSE:     Dorsum:   straight  Septum:  midline  Mucosa:  moist  Nasal cavity:  rhinorocket in place on LEFT; no active bleeding noted; additional 2 ml placed into balloon        ORAL CAVITY/OROPHARYNX:     Lips:  Normal.  Tongue: normal, midline  Mucosa:   no lesions     NECK:  Trachea:  midline.              Thyroid:  normal              Adenopathy:  none        NEURO:   Alert and Oriented     GAIT AND STATION:  normal     RESPIRATORY:   Symmetry and Respiratory effort     PSYCH:  Normal mood and affect     SKIN:   warm and dry         IMPRESSION:    Encounter Diagnosis   Name Primary?    Epistaxis Yes          RECOMMENDATIONS:      Orders Placed This Encounter   Procedures    INR    Partial thromboplastin time    CBC with platelets and differential     Return visit FRIDAY  for rhino-rocket removal  No strenuous activity  Sneeze with mouth open

## 2025-05-05 NOTE — LETTER
May 5, 2025      Rufino Turner  [address]  [place] [facility] [address]        To Whom It May Concern:    Rufino Turner  was seen on 05/05/25.  Please excuse him due to clinic appointment.        Sincerely,        Toi Schafer MD    Electronically signed

## 2025-05-05 NOTE — TELEPHONE ENCOUNTER
Spoke with patient and scheduled appointment today in [place].    Maggie Arriola RN Care Coordinator, [facility] 05/05/25 9:59 AM

## 2025-05-05 NOTE — LETTER
5/5/2025      Rufino Turner  [address]  [place] [facility] [address]      Dear Colleague,    Thank you for referring your patient, Rufino Turner, to the [facility]. Please see a copy of my visit note below.    CHIEF COMPLAINT: Patient presents with:  Epistaxis: Per PT last bleed was one hr ago but it is stable now.          HISTORY OF PRESENT ILLNESS    Vinod was seen at the behest of  Solomon Wilson MD for epistaxis.       ED VISIT 5/2/2025    Epistaxis     Impression and Plan   MDM: Rufino Turner is a 49 year old male who presents to the ED for evaluation of epistaxis. Patient had a sudden onset of epistaxis 3 nights ago from his left nostril and since has been having epistaxis daily which lasts 15 minutes then stops for about 1-2 hours. He went to urgent care today and had stopped the bleeding with a clamp, but 1 hour later bleeding started again.  Denies any nasal trauma or anticoagulant use.     Vitals reviewed and are stable. On exam patient initially not having any active bleeding from either nostril.  However, upon reevaluation, patient having significant bleeding from his left nostril.  No specific source of the bleed cannot be seen on examination.  Patient regularly spitting up blood.     Initially, bleeding is controlled in the ED and no obvious source of active bleed can be visualized, so TXA soaked cotton balls were inserted into the left nostril after patient blew his nose. After 20 minutes, patient was still having persistent bleeding out of his right nostril. Merocel used to pack the left nostril, and this stopped the bleeding. I discussed with the patient that he should leave the packing in and avoid getting it wet. I placed an ENT referral and stated the patient should follow up with them for removal. However, if he is unable to get in to ENT within 48 hours, I informed him that he should be seen in urgent care or back in the ER for removal of the packing.  Patient is comfortable and okay  for discharge.       REVIEW OF SYSTEMS    Review of Systems as per HPI and PMHx, otherwise 10 system review system are negative.       ALLERGIES    Contrast [iodixanol] and Shrimp [shrimp extract]    CURRENT MEDICATIONS      Current Outpatient Medications:      amLODIPine-benazepril (LOTREL) 10-40 MG capsule, Take 1 capsule by mouth daily., Disp: 90 capsule, Rfl: 1     PAST MEDICAL HISTORY    PAST MEDICAL HISTORY:   Past Medical History:   Diagnosis Date     Benign essential hypertension        PAST SURGICAL HISTORY    PAST SURGICAL HISTORY: No past surgical history on file.    FAMILY  HISTORY    FAMILY HISTORY:   Family History   Problem Relation Age of Onset     Kidney failure Mother        SOCIAL HISTORY    SOCIAL HISTORY:   Social History     Tobacco Use     Smoking status: Every Day     Current packs/day: 0.00     Types: Cigarettes     Last attempt to quit: 2/15/2018     Years since quittin.2     Passive exposure: Current     Smokeless tobacco: Never   Substance Use Topics     Alcohol use: Not Currently        PHYSICAL EXAM    HEAD: Normal appearance and symmetry:  No cutaneous lesions.      NECK:  supple     EARS:    Right:   TM intact   LEFT:  TM Intact    EYES:  EOMI    CN VII/XII:  intact     NOSE:     Dorsum:   straight  Septum:  midline  Mucosa:  moist  Nasal cavity:  rhinorocket in place on LEFT; no active bleeding noted; additional 2 ml placed into balloon        ORAL CAVITY/OROPHARYNX:     Lips:  Normal.  Tongue: normal, midline  Mucosa:   no lesions     NECK:  Trachea:  midline.              Thyroid:  normal              Adenopathy:  none        NEURO:   Alert and Oriented     GAIT AND STATION:  normal     RESPIRATORY:   Symmetry and Respiratory effort     PSYCH:  Normal mood and affect     SKIN:   warm and dry         IMPRESSION:    Encounter Diagnosis   Name Primary?     Epistaxis Yes          RECOMMENDATIONS:      Orders Placed This Encounter   Procedures     INR     Partial thromboplastin time      CBC with platelets and differential     Return visit FRIDAY  for rhino-rocket removal  No strenuous activity  Sneeze with mouth open        Again, thank you for allowing me to participate in the care of your patient.        Sincerely,        Toi Schafer MD    Electronically signed

## 2025-05-05 NOTE — TELEPHONE ENCOUNTER
Nurse Triage SBAR    Is this a 2nd Level Triage? YES, LICENSED PRACTITIONER REVIEW IS REQUIRED    Situation: Nose bleeds continue with rhino rocket in place    Background: Patient states nosebleed started on Tues. No injury to nose. Has never had this problem before. Went to urgent care, bleeding had stopped but restarted so went to ED. Had [employer] which worked for 4 hours. Went back to Ed and had rhino rocket placed.   He states that he is continuing to bleed, especially if he eats/drinks anything.   Not currently bleeding. Last bleed at 7 am.   /90 usually, does not always take his Lotrel. Not taking any ibuprofen or aspirin on regular basis.     Assessment: Continued nosebleeds.    Protocol Recommended Disposition:   See in Office Today    Recommendation: Recommended ED if nosebleed does not stop after 30 minutes. Please call patint with appointment at [phone]. Patient would prefer not to go to ED again.      Routed to provider/Team      Michelle Tavares RN  [facility] Central Nursing/Red Flag Triage & Med Refill Team       Reason for Disposition   Bleeding recurs 3 or more times in 24 hours despite direct pressure    Additional Information   Negative: Fainted (passed out), or too weak to stand following large blood loss   Negative: Sounds like a life-threatening emergency to the triager   Negative: Nosebleed followed nose injury   Negative: Bleeding present > 30 minutes and using correct method of direct pressure   Negative: Bleeding now and second call after being instructed in correct technique of direct pressure   Negative: Feeling weak or lightheaded (e.g., woozy, feeling like they might faint)   Negative: Pale skin (pallor) of new-onset or getting worse   Negative: Has nasal packing (inserted by health care provider to control bleeding) and now has new rash   Negative: Has nasal packing and now has bleeding around the packing  (Exception: Few drops or ooze.)   Negative: Patient sounds very sick  "or weak to the triager   Negative: Large amount of blood has been lost (e.g., one cup)    Answer Assessment - Initial Assessment Questions  1. AMOUNT OF BLEEDING: \"How bad is the bleeding?\" \"How much blood was lost?\" \"Has the bleeding stopped?\"      unsure  2. ONSET: \"When did the nosebleed start?\"       Last Tues   3. FREQUENCY: \"How many nosebleeds have you had in the last 24 hours?\"       twice  4. RECURRENT SYMPTOMS: \"Have there been other recent nosebleeds?\" If Yes, ask: \"How long did it take you to stop the bleeding?\" \"What worked best?\"       never  5. CAUSE: \"What do you think caused this nosebleed?\"      Not sure  6. LOCAL FACTORS: \"Do you have any cold symptoms?\", \"Have you been rubbing or picking at your nose?\"      no  7. SYSTEMIC FACTORS: \"Do you have high blood pressure or any bleeding problems?\"      Hypertension  8. BLOOD THINNERS: \"Do you take any blood thinners?\" (e.g., aspirin, clopidogrel / Plavix, coumadin, heparin). Notes: Other strong blood thinners include: Arixtra (fondaparinux), Eliquis (apixaban), Pradaxa (dabigatran), and Xarelto (rivaroxaban).      Occasional aspirin  9. OTHER SYMPTOMS: \"Do you have any other symptoms?\" (e.g., lightheadedness)      Nausea from swallowing blood.  10. PREGNANCY: \"Is there any chance you are pregnant?\" \"When was your last menstrual period?\"        na    Protocols used: Nosebleed-A-OH    "

## 2025-05-05 NOTE — LETTER
May 5, 2025      Rufino Turner  [address]  [place] [address]        To Whom It May Concern:    Rufino Turner  was seen on 05/05/25.  Please excuse him  until 05/09/25 due to illness.        Sincerely,        Toi Schafer MD    Electronically signed

## 2025-05-23 ENCOUNTER — RESULTS FOLLOW-UP (OUTPATIENT)
Dept: FAMILY MEDICINE | Facility: CLINIC | Age: 50
End: 2025-05-23

## 2025-08-20 ENCOUNTER — PATIENT OUTREACH (OUTPATIENT)
Dept: CARE COORDINATION | Facility: CLINIC | Age: 50
End: 2025-08-20
Payer: COMMERCIAL